# Patient Record
Sex: FEMALE | Race: WHITE | NOT HISPANIC OR LATINO | Employment: OTHER | URBAN - METROPOLITAN AREA
[De-identification: names, ages, dates, MRNs, and addresses within clinical notes are randomized per-mention and may not be internally consistent; named-entity substitution may affect disease eponyms.]

---

## 2017-01-23 ENCOUNTER — HOSPITAL ENCOUNTER (OUTPATIENT)
Dept: PREOP | Facility: HOSPITAL | Age: 52
Setting detail: HOSPITAL OUTPATIENT SURGERY
Discharge: HOME | End: 2017-01-23
Attending: SURGERY | Admitting: SURGERY

## 2017-01-23 LAB
ANION GAP SERPL CALC-SCNC: 11.9 MMOL/L (ref 10–20)
BUN SERPL-MCNC: 36 MG/DL (ref 8–20)
BUN/CREAT SERPL: 5.9 (ref 5.4–26.2)
CALCIUM SERPL-MCNC: 8.7 MG/DL (ref 8.9–10.3)
CHLORIDE SERPL-SCNC: 102 MMOL/L (ref 101–111)
CONV CO2: 26 MMOL/L (ref 22–32)
CREAT UR-MCNC: 6.1 MG/DL (ref 0.4–1)
GLUCOSE BLD-MCNC: 106 MG/DL (ref 70–105)
GLUCOSE SERPL-MCNC: 117 MG/DL (ref 65–99)
POTASSIUM SERPL-SCNC: 3.9 MMOL/L (ref 3.6–5.1)
SODIUM SERPL-SCNC: 136 MMOL/L (ref 136–144)

## 2017-03-20 ENCOUNTER — HOSPITAL ENCOUNTER (OUTPATIENT)
Dept: INTERVENTIONAL RADIOLOGY/VASCULAR | Facility: HOSPITAL | Age: 52
Discharge: HOME | End: 2017-03-20
Attending: INTERNAL MEDICINE | Admitting: INTERNAL MEDICINE

## 2017-06-14 ENCOUNTER — HOSPITAL ENCOUNTER (OUTPATIENT)
Dept: INTERVENTIONAL RADIOLOGY/VASCULAR | Facility: HOSPITAL | Age: 52
Discharge: HOME | End: 2017-06-14
Attending: INTERNAL MEDICINE | Admitting: INTERNAL MEDICINE

## 2017-06-14 LAB
APTT BLD: 22.1 SEC (ref 24–31)
BACTERIA SPEC AEROBE CULT: NORMAL
BASOPHILS # BLD AUTO: 0 10*3/UL (ref 0–0.2)
BASOPHILS NFR BLD AUTO: 1 % (ref 0–2)
DIFFERENTIAL METHOD BLD: (no result)
EOSINOPHIL # BLD AUTO: 0.1 10*3/UL (ref 0–0.3)
EOSINOPHIL # BLD AUTO: 2 % (ref 0–3)
ERYTHROCYTE [DISTWIDTH] IN BLOOD BY AUTOMATED COUNT: 16.3 % (ref 11.5–14.5)
HCT VFR BLD AUTO: 25.3 % (ref 35–49)
HGB BLD-MCNC: 8.6 G/DL (ref 12–15)
INR PPP: 0.9
LYMPHOCYTES # BLD AUTO: 1.9 10*3/UL (ref 0.8–4.8)
LYMPHOCYTES NFR BLD AUTO: 26 % (ref 18–42)
Lab: NORMAL
MCH RBC QN AUTO: 32.2 PG (ref 26–32)
MCHC RBC AUTO-ENTMCNC: 33.9 G/DL (ref 32–36)
MCV RBC AUTO: 95.1 FL (ref 80–94)
MICRO REPORT STATUS: NORMAL
MONOCYTES # BLD AUTO: 0.5 10*3/UL (ref 0.1–1.3)
MONOCYTES NFR BLD AUTO: 7 % (ref 2–11)
NEUTROPHILS # BLD AUTO: 4.6 10*3/UL (ref 2.3–8.6)
NEUTROPHILS NFR BLD AUTO: 64 % (ref 50–75)
NRBC BLD AUTO-RTO: 0 /100{WBCS}
NRBC/RBC NFR BLD MANUAL: 0 10*3/UL
PLATELET # BLD AUTO: 190 10*3/UL (ref 150–450)
PMV BLD AUTO: 8.2 FL (ref 7.4–10.4)
PROTHROMBIN TIME: 10.3 SEC (ref 9.6–11.7)
RBC # BLD AUTO: 2.65 10*6/UL (ref 4–5.4)
SPECIMEN SOURCE: NORMAL
WBC # BLD AUTO: 7.2 10*3/UL (ref 4.5–11.5)

## 2017-06-26 ENCOUNTER — HOSPITAL ENCOUNTER (OUTPATIENT)
Dept: INTERVENTIONAL RADIOLOGY/VASCULAR | Facility: HOSPITAL | Age: 52
Discharge: HOME | End: 2017-06-26
Attending: INTERNAL MEDICINE | Admitting: INTERNAL MEDICINE

## 2017-07-12 ENCOUNTER — HOSPITAL ENCOUNTER (OUTPATIENT)
Dept: CT IMAGING | Facility: HOSPITAL | Age: 52
Discharge: HOME | End: 2017-07-12
Attending: INTERNAL MEDICINE | Admitting: INTERNAL MEDICINE

## 2017-07-19 ENCOUNTER — HOSPITAL ENCOUNTER (OUTPATIENT)
Dept: INTERVENTIONAL RADIOLOGY/VASCULAR | Facility: HOSPITAL | Age: 52
Discharge: HOME | End: 2017-07-19
Attending: INTERNAL MEDICINE | Admitting: INTERNAL MEDICINE

## 2017-09-20 ENCOUNTER — HOSPITAL ENCOUNTER (OUTPATIENT)
Dept: SLEEP MEDICINE | Facility: HOSPITAL | Age: 52
Discharge: HOME | End: 2017-09-20
Attending: INTERNAL MEDICINE | Admitting: INTERNAL MEDICINE

## 2017-10-04 ENCOUNTER — HOSPITAL ENCOUNTER (OUTPATIENT)
Dept: SLEEP MEDICINE | Facility: HOSPITAL | Age: 52
Discharge: HOME | End: 2017-10-04
Attending: INTERNAL MEDICINE | Admitting: INTERNAL MEDICINE

## 2018-09-25 ENCOUNTER — INPATIENT HOSPITAL (AMBULATORY)
Dept: URBAN - METROPOLITAN AREA HOSPITAL 76 | Facility: HOSPITAL | Age: 53
End: 2018-09-25

## 2018-09-25 DIAGNOSIS — K64.3 FOURTH DEGREE HEMORRHOIDS: ICD-10-CM

## 2018-09-25 DIAGNOSIS — I25.10 ATHEROSCLEROTIC HEART DISEASE OF NATIVE CORONARY ARTERY WITH: ICD-10-CM

## 2018-09-25 DIAGNOSIS — K63.5 POLYP OF COLON: ICD-10-CM

## 2018-09-25 DIAGNOSIS — D50.0 IRON DEFICIENCY ANEMIA SECONDARY TO BLOOD LOSS (CHRONIC): ICD-10-CM

## 2018-09-25 DIAGNOSIS — D12.3 BENIGN NEOPLASM OF TRANSVERSE COLON: ICD-10-CM

## 2018-09-25 DIAGNOSIS — K92.1 MELENA: ICD-10-CM

## 2018-09-25 DIAGNOSIS — K62.1 RECTAL POLYP: ICD-10-CM

## 2018-09-25 DIAGNOSIS — N18.6 END STAGE RENAL DISEASE: ICD-10-CM

## 2018-09-25 PROCEDURE — 99222 1ST HOSP IP/OBS MODERATE 55: CPT | Performed by: INTERNAL MEDICINE

## 2018-09-26 PROCEDURE — 44360 SMALL BOWEL ENDOSCOPY: CPT | Performed by: INTERNAL MEDICINE

## 2018-09-26 PROCEDURE — 45385 COLONOSCOPY W/LESION REMOVAL: CPT | Performed by: INTERNAL MEDICINE

## 2018-10-19 ENCOUNTER — INPATIENT HOSPITAL (AMBULATORY)
Dept: URBAN - METROPOLITAN AREA HOSPITAL 84 | Facility: HOSPITAL | Age: 53
End: 2018-10-19

## 2018-10-19 DIAGNOSIS — D53.9 NUTRITIONAL ANEMIA, UNSPECIFIED: ICD-10-CM

## 2018-10-19 DIAGNOSIS — I25.10 ATHEROSCLEROTIC HEART DISEASE OF NATIVE CORONARY ARTERY WITH: ICD-10-CM

## 2018-10-19 DIAGNOSIS — R07.9 CHEST PAIN, UNSPECIFIED: ICD-10-CM

## 2018-10-19 DIAGNOSIS — E78.5 HYPERLIPIDEMIA, UNSPECIFIED: ICD-10-CM

## 2018-10-19 DIAGNOSIS — R06.00 DYSPNEA, UNSPECIFIED: ICD-10-CM

## 2018-10-19 DIAGNOSIS — I50.9 HEART FAILURE, UNSPECIFIED: ICD-10-CM

## 2018-10-19 DIAGNOSIS — R13.10 DYSPHAGIA, UNSPECIFIED: ICD-10-CM

## 2018-10-19 DIAGNOSIS — R11.2 NAUSEA WITH VOMITING, UNSPECIFIED: ICD-10-CM

## 2018-10-19 PROCEDURE — 99222 1ST HOSP IP/OBS MODERATE 55: CPT | Performed by: NURSE PRACTITIONER

## 2018-10-30 ENCOUNTER — ON CAMPUS - OUTPATIENT (AMBULATORY)
Dept: URBAN - METROPOLITAN AREA HOSPITAL 85 | Facility: HOSPITAL | Age: 53
End: 2018-10-30

## 2018-10-30 DIAGNOSIS — R11.2 NAUSEA WITH VOMITING, UNSPECIFIED: ICD-10-CM

## 2018-10-30 DIAGNOSIS — K92.1 MELENA: ICD-10-CM

## 2018-10-30 DIAGNOSIS — K92.2 GASTROINTESTINAL HEMORRHAGE, UNSPECIFIED: ICD-10-CM

## 2018-10-30 DIAGNOSIS — D64.89 OTHER SPECIFIED ANEMIAS: ICD-10-CM

## 2018-10-30 PROCEDURE — 99214 OFFICE O/P EST MOD 30 MIN: CPT | Performed by: NURSE PRACTITIONER

## 2018-11-08 ENCOUNTER — OFFICE (AMBULATORY)
Dept: URBAN - METROPOLITAN AREA CLINIC 64 | Facility: CLINIC | Age: 53
End: 2018-11-08

## 2018-11-08 DIAGNOSIS — D50.0 IRON DEFICIENCY ANEMIA SECONDARY TO BLOOD LOSS (CHRONIC): ICD-10-CM

## 2018-11-08 DIAGNOSIS — Z98.890 OTHER SPECIFIED POSTPROCEDURAL STATES: ICD-10-CM

## 2018-11-08 PROCEDURE — 91110 GI TRC IMG INTRAL ESOPH-ILE: CPT | Mod: 53 | Performed by: INTERNAL MEDICINE

## 2018-12-04 ENCOUNTER — ON CAMPUS - OUTPATIENT (AMBULATORY)
Dept: URBAN - METROPOLITAN AREA HOSPITAL 85 | Facility: HOSPITAL | Age: 53
End: 2018-12-04

## 2018-12-04 ENCOUNTER — HOSPITAL ENCOUNTER (OUTPATIENT)
Dept: GASTROENTEROLOGY | Facility: HOSPITAL | Age: 53
Setting detail: HOSPITAL OUTPATIENT SURGERY
Discharge: HOME | End: 2018-12-04
Attending: INTERNAL MEDICINE | Admitting: INTERNAL MEDICINE

## 2018-12-04 ENCOUNTER — OFFICE (AMBULATORY)
Dept: URBAN - METROPOLITAN AREA CLINIC 64 | Facility: CLINIC | Age: 53
End: 2018-12-04

## 2018-12-04 DIAGNOSIS — D50.0 IRON DEFICIENCY ANEMIA SECONDARY TO BLOOD LOSS (CHRONIC): ICD-10-CM

## 2018-12-04 DIAGNOSIS — K92.2 GASTROINTESTINAL HEMORRHAGE, UNSPECIFIED: ICD-10-CM

## 2018-12-04 DIAGNOSIS — Z98.890 OTHER SPECIFIED POSTPROCEDURAL STATES: ICD-10-CM

## 2018-12-04 LAB — GLUCOSE BLD-MCNC: 121 MG/DL (ref 70–105)

## 2018-12-04 PROCEDURE — 43235 EGD DIAGNOSTIC BRUSH WASH: CPT | Performed by: INTERNAL MEDICINE

## 2018-12-04 PROCEDURE — 91110 GI TRC IMG INTRAL ESOPH-ILE: CPT | Performed by: INTERNAL MEDICINE

## 2019-03-25 ENCOUNTER — INPATIENT HOSPITAL (AMBULATORY)
Dept: URBAN - METROPOLITAN AREA HOSPITAL 84 | Facility: HOSPITAL | Age: 54
End: 2019-03-25

## 2019-03-25 DIAGNOSIS — D64.9 ANEMIA, UNSPECIFIED: ICD-10-CM

## 2019-03-25 DIAGNOSIS — I25.10 ATHEROSCLEROTIC HEART DISEASE OF NATIVE CORONARY ARTERY WITH: ICD-10-CM

## 2019-03-25 DIAGNOSIS — K59.00 CONSTIPATION, UNSPECIFIED: ICD-10-CM

## 2019-03-25 DIAGNOSIS — I50.9 HEART FAILURE, UNSPECIFIED: ICD-10-CM

## 2019-03-25 DIAGNOSIS — K92.1 MELENA: ICD-10-CM

## 2019-03-25 PROCEDURE — 99222 1ST HOSP IP/OBS MODERATE 55: CPT | Performed by: NURSE PRACTITIONER

## 2019-03-26 ENCOUNTER — INPATIENT HOSPITAL (AMBULATORY)
Dept: URBAN - METROPOLITAN AREA HOSPITAL 84 | Facility: HOSPITAL | Age: 54
End: 2019-03-26

## 2019-03-26 DIAGNOSIS — K64.8 OTHER HEMORRHOIDS: ICD-10-CM

## 2019-03-26 DIAGNOSIS — K55.8 OTHER VASCULAR DISORDERS OF INTESTINE: ICD-10-CM

## 2019-03-26 DIAGNOSIS — K92.1 MELENA: ICD-10-CM

## 2019-03-26 PROCEDURE — 44360 SMALL BOWEL ENDOSCOPY: CPT | Performed by: INTERNAL MEDICINE

## 2019-03-26 PROCEDURE — 45378 DIAGNOSTIC COLONOSCOPY: CPT | Performed by: INTERNAL MEDICINE

## 2019-04-11 ENCOUNTER — INPATIENT HOSPITAL (AMBULATORY)
Dept: URBAN - METROPOLITAN AREA HOSPITAL 84 | Facility: HOSPITAL | Age: 54
End: 2019-04-11

## 2019-04-11 DIAGNOSIS — K92.1 MELENA: ICD-10-CM

## 2019-04-11 DIAGNOSIS — R11.2 NAUSEA WITH VOMITING, UNSPECIFIED: ICD-10-CM

## 2019-04-11 DIAGNOSIS — D50.0 IRON DEFICIENCY ANEMIA SECONDARY TO BLOOD LOSS (CHRONIC): ICD-10-CM

## 2019-04-11 DIAGNOSIS — K59.00 CONSTIPATION, UNSPECIFIED: ICD-10-CM

## 2019-04-11 DIAGNOSIS — R10.11 RIGHT UPPER QUADRANT PAIN: ICD-10-CM

## 2019-04-11 PROCEDURE — 99222 1ST HOSP IP/OBS MODERATE 55: CPT | Performed by: NURSE PRACTITIONER

## 2019-07-01 ENCOUNTER — HOSPITAL ENCOUNTER (INPATIENT)
Facility: HOSPITAL | Age: 54
LOS: 3 days | Discharge: HOME | End: 2019-07-05
Attending: INTERNAL MEDICINE | Admitting: INTERNAL MEDICINE

## 2019-07-01 PROBLEM — E66.9 OBESITY (BMI 30-39.9): Chronic | Status: ACTIVE | Noted: 2019-07-01

## 2019-07-01 PROBLEM — E11.40 TYPE 2 DIABETES MELLITUS WITH DIABETIC NEUROPATHY (HCC): Chronic | Status: ACTIVE | Noted: 2019-07-01

## 2019-07-01 PROBLEM — K21.9 GASTROESOPHAGEAL REFLUX DISEASE: Chronic | Status: ACTIVE | Noted: 2019-07-01

## 2019-07-01 PROBLEM — R77.8 ELEVATED TROPONIN: Status: ACTIVE | Noted: 2019-07-01

## 2019-07-01 PROBLEM — K21.9 GASTROESOPHAGEAL REFLUX DISEASE: Status: ACTIVE | Noted: 2019-07-01

## 2019-07-01 PROBLEM — E03.9 HYPOTHYROIDISM: Status: ACTIVE | Noted: 2019-07-01

## 2019-07-01 PROBLEM — E11.9 TYPE 2 DIABETES MELLITUS WITHOUT COMPLICATIONS (HCC): Status: ACTIVE | Noted: 2019-07-01

## 2019-07-01 PROBLEM — I16.0 HYPERTENSIVE URGENCY: Status: ACTIVE | Noted: 2019-07-01

## 2019-07-01 PROBLEM — N18.9 ACUTE ON CHRONIC RENAL FAILURE (HCC): Status: ACTIVE | Noted: 2018-11-19

## 2019-07-01 PROBLEM — E11.9 TYPE 2 DIABETES MELLITUS WITHOUT COMPLICATIONS (HCC): Chronic | Status: ACTIVE | Noted: 2019-07-01

## 2019-07-01 PROBLEM — Z99.2 ESRD ON DIALYSIS (HCC): Chronic | Status: ACTIVE | Noted: 2018-09-05

## 2019-07-01 PROBLEM — Z99.2 ESRD ON DIALYSIS (HCC): Status: ACTIVE | Noted: 2018-09-05

## 2019-07-01 PROBLEM — Z91.199 MEDICALLY NONCOMPLIANT: Chronic | Status: ACTIVE | Noted: 2019-07-01

## 2019-07-01 PROBLEM — N17.9 ACUTE ON CHRONIC RENAL FAILURE (HCC): Status: ACTIVE | Noted: 2018-11-19

## 2019-07-01 PROBLEM — N18.6 ESRD ON DIALYSIS (HCC): Status: ACTIVE | Noted: 2018-09-05

## 2019-07-01 PROBLEM — R10.9 ABDOMINAL PAIN: Status: ACTIVE | Noted: 2019-07-01

## 2019-07-01 PROBLEM — F17.200 TOBACCO DEPENDENCE SYNDROME: Status: ACTIVE | Noted: 2017-05-24

## 2019-07-01 PROBLEM — E78.5 HYPERLIPIDEMIA: Chronic | Status: ACTIVE | Noted: 2019-07-01

## 2019-07-01 PROBLEM — N25.81 HYPERPARATHYROIDISM DUE TO RENAL INSUFFICIENCY (HCC): Status: ACTIVE | Noted: 2017-05-24

## 2019-07-01 PROBLEM — N18.9 HISTORY OF ANEMIA DUE TO CKD: Chronic | Status: ACTIVE | Noted: 2019-07-01

## 2019-07-01 PROBLEM — N19 ACUTE UREMIA: Status: ACTIVE | Noted: 2019-07-01

## 2019-07-01 PROBLEM — Z86.2 HISTORY OF ANEMIA DUE TO CKD: Chronic | Status: ACTIVE | Noted: 2019-07-01

## 2019-07-01 PROBLEM — N18.6 ESRD ON DIALYSIS (HCC): Chronic | Status: ACTIVE | Noted: 2018-09-05

## 2019-07-01 PROBLEM — N25.81 HYPERPARATHYROIDISM DUE TO RENAL INSUFFICIENCY (HCC): Chronic | Status: ACTIVE | Noted: 2017-05-24

## 2019-07-01 PROBLEM — F17.200 TOBACCO DEPENDENCE SYNDROME: Chronic | Status: ACTIVE | Noted: 2017-05-24

## 2019-07-01 PROBLEM — E78.5 HYPERLIPIDEMIA: Status: ACTIVE | Noted: 2019-07-01

## 2019-07-01 PROBLEM — E03.9 HYPOTHYROIDISM: Chronic | Status: ACTIVE | Noted: 2019-07-01

## 2019-07-01 LAB
ANION GAP SERPL CALCULATED.3IONS-SCNC: 16.8 MMOL/L (ref 10–20)
BASOPHILS # BLD AUTO: 0 10*3/MM3 (ref 0–0.2)
BASOPHILS NFR BLD AUTO: 0.9 % (ref 0–1.5)
BUN BLD-MCNC: 63 MG/DL (ref 8–20)
BUN/CREAT SERPL: 6.5 (ref 5.4–26.2)
CALCIUM SPEC-SCNC: 8.2 MG/DL (ref 8.9–10.3)
CHLORIDE SERPL-SCNC: 110 MMOL/L (ref 101–111)
CO2 SERPL-SCNC: 15 MMOL/L (ref 22–32)
CREAT BLD-MCNC: 9.7 MG/DL (ref 0.4–1)
DEPRECATED RDW RBC AUTO: 66.9 FL (ref 37–54)
EOSINOPHIL # BLD AUTO: 0.1 10*3/MM3 (ref 0–0.4)
EOSINOPHIL NFR BLD AUTO: 1.4 % (ref 0.3–6.2)
ERYTHROCYTE [DISTWIDTH] IN BLOOD BY AUTOMATED COUNT: 19.5 % (ref 12.3–15.4)
GFR SERPL CREATININE-BSD FRML MDRD: 4 ML/MIN/1.73
GFR SERPL CREATININE-BSD FRML MDRD: ABNORMAL ML/MIN/1.73
GLUCOSE BLD-MCNC: 158 MG/DL (ref 65–99)
GLUCOSE BLDC GLUCOMTR-MCNC: 112 MG/DL (ref 70–105)
GLUCOSE BLDC GLUCOMTR-MCNC: 113 MG/DL (ref 70–105)
GLUCOSE BLDC GLUCOMTR-MCNC: 135 MG/DL (ref 70–105)
HBA1C MFR BLD: 4.7 % (ref 3.5–5.6)
HCT VFR BLD AUTO: 23.9 % (ref 34–46.6)
HGB BLD-MCNC: 7.5 G/DL (ref 12–15.9)
LYMPHOCYTES # BLD AUTO: 0.9 10*3/MM3 (ref 0.7–3.1)
LYMPHOCYTES NFR BLD AUTO: 17.6 % (ref 19.6–45.3)
MCH RBC QN AUTO: 30.4 PG (ref 26.6–33)
MCHC RBC AUTO-ENTMCNC: 31.5 G/DL (ref 31.5–35.7)
MCV RBC AUTO: 96.7 FL (ref 79–97)
MONOCYTES # BLD AUTO: 0.3 10*3/MM3 (ref 0.1–0.9)
MONOCYTES NFR BLD AUTO: 5.7 % (ref 5–12)
NEUTROPHILS # BLD AUTO: 3.6 10*3/MM3 (ref 1.7–7)
NEUTROPHILS NFR BLD AUTO: 74.4 % (ref 42.7–76)
NRBC BLD AUTO-RTO: 0.2 /100 WBC (ref 0–0.2)
PLATELET # BLD AUTO: 127 10*3/MM3 (ref 140–450)
PMV BLD AUTO: 7.9 FL (ref 6–12)
POTASSIUM BLD-SCNC: 3.8 MMOL/L (ref 3.6–5.1)
RBC # BLD AUTO: 2.48 10*6/MM3 (ref 3.77–5.28)
SODIUM BLD-SCNC: 138 MMOL/L (ref 136–144)
TROPONIN I SERPL-MCNC: 0.06 NG/ML (ref 0–0.03)
TROPONIN I SERPL-MCNC: 0.07 NG/ML (ref 0–0.03)
TROPONIN I SERPL-MCNC: 0.1 NG/ML (ref 0–0.03)
WBC NRBC COR # BLD: 4.9 10*3/MM3 (ref 3.4–10.8)

## 2019-07-01 PROCEDURE — 99223 1ST HOSP IP/OBS HIGH 75: CPT | Performed by: NURSE PRACTITIONER

## 2019-07-01 PROCEDURE — 25010000002 ONDANSETRON PER 1 MG: Performed by: NURSE PRACTITIONER

## 2019-07-01 PROCEDURE — 85025 COMPLETE CBC W/AUTO DIFF WBC: CPT | Performed by: INTERNAL MEDICINE

## 2019-07-01 PROCEDURE — G0378 HOSPITAL OBSERVATION PER HR: HCPCS

## 2019-07-01 PROCEDURE — 82962 GLUCOSE BLOOD TEST: CPT

## 2019-07-01 PROCEDURE — 83036 HEMOGLOBIN GLYCOSYLATED A1C: CPT | Performed by: NURSE PRACTITIONER

## 2019-07-01 PROCEDURE — 87340 HEPATITIS B SURFACE AG IA: CPT | Performed by: INTERNAL MEDICINE

## 2019-07-01 PROCEDURE — 94799 UNLISTED PULMONARY SVC/PX: CPT

## 2019-07-01 PROCEDURE — 80048 BASIC METABOLIC PNL TOTAL CA: CPT | Performed by: INTERNAL MEDICINE

## 2019-07-01 PROCEDURE — 84484 ASSAY OF TROPONIN QUANT: CPT | Performed by: NURSE PRACTITIONER

## 2019-07-01 PROCEDURE — 25010000002 HYDRALAZINE PER 20 MG: Performed by: NURSE PRACTITIONER

## 2019-07-01 PROCEDURE — 86704 HEP B CORE ANTIBODY TOTAL: CPT | Performed by: INTERNAL MEDICINE

## 2019-07-01 PROCEDURE — 93005 ELECTROCARDIOGRAM TRACING: CPT | Performed by: NURSE PRACTITIONER

## 2019-07-01 PROCEDURE — 86706 HEP B SURFACE ANTIBODY: CPT | Performed by: INTERNAL MEDICINE

## 2019-07-01 RX ORDER — TRAMADOL HYDROCHLORIDE 50 MG/1
50 TABLET ORAL EVERY 6 HOURS PRN
Status: DISCONTINUED | OUTPATIENT
Start: 2019-07-01 | End: 2019-07-05 | Stop reason: HOSPADM

## 2019-07-01 RX ORDER — ROSUVASTATIN CALCIUM 20 MG/1
40 TABLET, COATED ORAL DAILY
COMMUNITY

## 2019-07-01 RX ORDER — CHOLECALCIFEROL (VITAMIN D3) 125 MCG
5 CAPSULE ORAL NIGHTLY PRN
Status: DISCONTINUED | OUTPATIENT
Start: 2019-07-01 | End: 2019-07-05 | Stop reason: HOSPADM

## 2019-07-01 RX ORDER — NICOTINE POLACRILEX 4 MG
15 LOZENGE BUCCAL
Status: DISCONTINUED | OUTPATIENT
Start: 2019-07-01 | End: 2019-07-05 | Stop reason: HOSPADM

## 2019-07-01 RX ORDER — SODIUM CHLORIDE 0.9 % (FLUSH) 0.9 %
3-10 SYRINGE (ML) INJECTION AS NEEDED
Status: DISCONTINUED | OUTPATIENT
Start: 2019-07-01 | End: 2019-07-05 | Stop reason: HOSPADM

## 2019-07-01 RX ORDER — ISOSORBIDE MONONITRATE 30 MG/1
30 TABLET, EXTENDED RELEASE ORAL EVERY MORNING
Status: DISCONTINUED | OUTPATIENT
Start: 2019-07-02 | End: 2019-07-05 | Stop reason: HOSPADM

## 2019-07-01 RX ORDER — ACETAMINOPHEN 650 MG/1
650 SUPPOSITORY RECTAL EVERY 4 HOURS PRN
Status: DISCONTINUED | OUTPATIENT
Start: 2019-07-01 | End: 2019-07-05 | Stop reason: HOSPADM

## 2019-07-01 RX ORDER — ASPIRIN 81 MG/1
81 TABLET ORAL DAILY
COMMUNITY

## 2019-07-01 RX ORDER — HYDRALAZINE HYDROCHLORIDE 20 MG/ML
20 INJECTION INTRAMUSCULAR; INTRAVENOUS ONCE
Status: COMPLETED | OUTPATIENT
Start: 2019-07-01 | End: 2019-07-01

## 2019-07-01 RX ORDER — SODIUM CHLORIDE 0.9 % (FLUSH) 0.9 %
3 SYRINGE (ML) INJECTION EVERY 12 HOURS SCHEDULED
Status: DISCONTINUED | OUTPATIENT
Start: 2019-07-01 | End: 2019-07-05 | Stop reason: HOSPADM

## 2019-07-01 RX ORDER — DOCUSATE SODIUM 100 MG/1
100 CAPSULE, LIQUID FILLED ORAL 2 TIMES DAILY PRN
Status: DISCONTINUED | OUTPATIENT
Start: 2019-07-01 | End: 2019-07-05 | Stop reason: HOSPADM

## 2019-07-01 RX ORDER — OMEPRAZOLE 40 MG/1
40 CAPSULE, DELAYED RELEASE ORAL DAILY
COMMUNITY

## 2019-07-01 RX ORDER — GABAPENTIN 300 MG/1
300 CAPSULE ORAL NIGHTLY
Status: DISCONTINUED | OUTPATIENT
Start: 2019-07-01 | End: 2019-07-05 | Stop reason: HOSPADM

## 2019-07-01 RX ORDER — DOCUSATE SODIUM 100 MG/1
100 CAPSULE, LIQUID FILLED ORAL 2 TIMES DAILY PRN
COMMUNITY

## 2019-07-01 RX ORDER — ROSUVASTATIN CALCIUM 10 MG/1
40 TABLET, COATED ORAL NIGHTLY
Status: DISCONTINUED | OUTPATIENT
Start: 2019-07-01 | End: 2019-07-05 | Stop reason: HOSPADM

## 2019-07-01 RX ORDER — PROMETHAZINE HYDROCHLORIDE 25 MG/1
25 TABLET ORAL EVERY 6 HOURS PRN
COMMUNITY

## 2019-07-01 RX ORDER — PANTOPRAZOLE SODIUM 40 MG/1
40 TABLET, DELAYED RELEASE ORAL EVERY MORNING
Status: DISCONTINUED | OUTPATIENT
Start: 2019-07-02 | End: 2019-07-05 | Stop reason: HOSPADM

## 2019-07-01 RX ORDER — ONDANSETRON 2 MG/ML
4 INJECTION INTRAMUSCULAR; INTRAVENOUS EVERY 6 HOURS PRN
Status: DISCONTINUED | OUTPATIENT
Start: 2019-07-01 | End: 2019-07-05 | Stop reason: HOSPADM

## 2019-07-01 RX ORDER — HYDRALAZINE HYDROCHLORIDE 20 MG/ML
10 INJECTION INTRAMUSCULAR; INTRAVENOUS EVERY 4 HOURS PRN
Status: DISCONTINUED | OUTPATIENT
Start: 2019-07-01 | End: 2019-07-05 | Stop reason: HOSPADM

## 2019-07-01 RX ORDER — LORAZEPAM 0.5 MG/1
0.5 TABLET ORAL ONCE
Status: COMPLETED | OUTPATIENT
Start: 2019-07-01 | End: 2019-07-01

## 2019-07-01 RX ORDER — ACETAMINOPHEN 325 MG/1
650 TABLET ORAL EVERY 4 HOURS PRN
Status: DISCONTINUED | OUTPATIENT
Start: 2019-07-01 | End: 2019-07-05 | Stop reason: HOSPADM

## 2019-07-01 RX ORDER — FLUCONAZOLE 100 MG/1
100 TABLET ORAL DAILY
COMMUNITY

## 2019-07-01 RX ORDER — LEVOTHYROXINE SODIUM 0.07 MG/1
75 TABLET ORAL
Status: DISCONTINUED | OUTPATIENT
Start: 2019-07-01 | End: 2019-07-05 | Stop reason: HOSPADM

## 2019-07-01 RX ORDER — GABAPENTIN 300 MG/1
300 CAPSULE ORAL DAILY
COMMUNITY

## 2019-07-01 RX ORDER — CARVEDILOL 6.25 MG/1
6.25 TABLET ORAL 2 TIMES DAILY WITH MEALS
Status: DISCONTINUED | OUTPATIENT
Start: 2019-07-01 | End: 2019-07-05 | Stop reason: HOSPADM

## 2019-07-01 RX ORDER — BISACODYL 10 MG
10 SUPPOSITORY, RECTAL RECTAL DAILY PRN
Status: DISCONTINUED | OUTPATIENT
Start: 2019-07-01 | End: 2019-07-05 | Stop reason: HOSPADM

## 2019-07-01 RX ORDER — FUROSEMIDE 40 MG/1
40 TABLET ORAL
Status: DISCONTINUED | OUTPATIENT
Start: 2019-07-01 | End: 2019-07-05 | Stop reason: HOSPADM

## 2019-07-01 RX ORDER — LEVOTHYROXINE SODIUM 0.07 MG/1
75 TABLET ORAL DAILY
COMMUNITY

## 2019-07-01 RX ORDER — NITROGLYCERIN 0.4 MG/1
0.4 TABLET SUBLINGUAL
Status: DISCONTINUED | OUTPATIENT
Start: 2019-07-01 | End: 2019-07-05 | Stop reason: HOSPADM

## 2019-07-01 RX ORDER — ONDANSETRON 4 MG/1
4 TABLET, FILM COATED ORAL EVERY 6 HOURS PRN
Status: DISCONTINUED | OUTPATIENT
Start: 2019-07-01 | End: 2019-07-05 | Stop reason: HOSPADM

## 2019-07-01 RX ORDER — DEXTROSE MONOHYDRATE 25 G/50ML
25 INJECTION, SOLUTION INTRAVENOUS
Status: DISCONTINUED | OUTPATIENT
Start: 2019-07-01 | End: 2019-07-05 | Stop reason: HOSPADM

## 2019-07-01 RX ORDER — ISOSORBIDE MONONITRATE 30 MG/1
30 TABLET, EXTENDED RELEASE ORAL DAILY
COMMUNITY

## 2019-07-01 RX ORDER — CLONIDINE 0.2 MG/24H
1 PATCH, EXTENDED RELEASE TRANSDERMAL WEEKLY
Status: DISCONTINUED | OUTPATIENT
Start: 2019-07-08 | End: 2019-07-05 | Stop reason: HOSPADM

## 2019-07-01 RX ORDER — CARVEDILOL 6.25 MG/1
6.25 TABLET ORAL 2 TIMES DAILY WITH MEALS
COMMUNITY

## 2019-07-01 RX ORDER — ASPIRIN 81 MG/1
81 TABLET ORAL DAILY
Status: DISCONTINUED | OUTPATIENT
Start: 2019-07-01 | End: 2019-07-05 | Stop reason: HOSPADM

## 2019-07-01 RX ORDER — FUROSEMIDE 40 MG/1
40 TABLET ORAL 2 TIMES DAILY
COMMUNITY

## 2019-07-01 RX ADMIN — TRAMADOL HYDROCHLORIDE 50 MG: 50 TABLET, COATED ORAL at 15:44

## 2019-07-01 RX ADMIN — ACETAMINOPHEN 650 MG: 325 TABLET, FILM COATED ORAL at 18:40

## 2019-07-01 RX ADMIN — TRAMADOL HYDROCHLORIDE 50 MG: 50 TABLET, COATED ORAL at 22:39

## 2019-07-01 RX ADMIN — ASPIRIN 81 MG: 81 TABLET, COATED ORAL at 15:44

## 2019-07-01 RX ADMIN — LEVOTHYROXINE SODIUM 75 MCG: 75 TABLET ORAL at 13:23

## 2019-07-01 RX ADMIN — HYDRALAZINE HYDROCHLORIDE 20 MG: 20 INJECTION INTRAMUSCULAR; INTRAVENOUS at 13:23

## 2019-07-01 RX ADMIN — Medication 10 ML: at 21:00

## 2019-07-01 RX ADMIN — Medication 3 ML: at 13:27

## 2019-07-01 RX ADMIN — LORAZEPAM 0.5 MG: 0.5 TABLET ORAL at 15:44

## 2019-07-01 RX ADMIN — ONDANSETRON 4 MG: 2 INJECTION INTRAMUSCULAR; INTRAVENOUS at 22:50

## 2019-07-01 RX ADMIN — ONDANSETRON HYDROCHLORIDE 4 MG: 4 TABLET, FILM COATED ORAL at 15:44

## 2019-07-01 NOTE — H&P
[facility] HOSPITALIST       PCP:  Flori Kauffman MD   Nephrology:  Dr. Benedict  GI:  Dr. Vicente      CHIEF COMPLAINT:     Abdominal pain    HISTORY OF PRESENT ILLNESS:    This is a 54-year-old obese  female with a history of ESRD dependent on hemodialysis, but chronically noncompliant, who was transferred from [facility] with complaint of abdominal pain. The patient states she cannot drive and has to depend on someone else to take her to her dialysis appointments. She states her last dialysis treatment was done approximately 1 month ago. She states she previously made an agreement with Dr. Benedict that she could do dialysis twice a week instead of three times per week. She states she was getting dialysis in [place], but now lives in [place] and is having difficulty finding a ride to dialysis. She states she has had severe, sharp abdominal pain for the past few days that is getting progressively worse. She reports nausea, but denies vomiting. She also reports some swelling of her abdomen. She denies any chest pain or shortness of breath. She denies any fever, chills, or diarrhea. She states she had a recent GI bleed and was referred to a specialist in [place]. She states she had to change the appointment around, but is scheduled to see the specialist next week. She denies any exacerbating or alleviating factors.     Review of records reveal the patient was admitted to [facility] on 6/7/19 for similar symptoms. She was discharged the same day after receiving dialysis.     ======================================================================================  Review of records from [facility]:    Significant labs:  hgb 7.8, plt 130, K 3.8, BUN 61, Cr .9.57, glucose 120, AST 37, protein 5.5, albumin 2.9, troponin 0.05    CT abd/pelvis:  Moderate ascites, unchanged. Diffuse body wall edema stable to slightly increased. Advanced atherosclerosis.      Medication given:  Clonidine 0.2 mg patch, Fentanyl 25 mcg IV, hydralazine 20 mg IV, and Zofran 4 mg IV  =======================================================================================    The patient was transferred here on 07/01/2019 for further evaluation and treatment.         Past Medical History:   Diagnosis Date   • COPD (chronic obstructive pulmonary disease) (CMS/[facility])    • Coronary artery disease    • Diabetes mellitus (CMS/[facility])    • Disease of thyroid gland    • Elevated cholesterol    • ESRD (end stage renal disease) on dialysis (CMS/[facility])    • GERD (gastroesophageal reflux disease)    • Hypertension    • Medically noncompliant      Past Surgical History:   Procedure Laterality Date   • ABDOMINAL SURGERY      Gall Bladder   • APPENDECTOMY     • CARDIAC CATHETERIZATION     • CARDIAC SURGERY     • COLONOSCOPY     • ENDOSCOPY     • HYSTERECTOMY     • VASCULAR SURGERY       History reviewed. No pertinent family history.  Social History     Tobacco Use   • Smoking status: Current Every Day Smoker     Packs/day: 0.50     Years: 30.00     Pack years: 15.00     Types: Cigarettes   • Smokeless tobacco: Never Used   Substance Use Topics   • Alcohol use: No     Frequency: Never   • Drug use: No     Medications Prior to Admission   Medication Sig Dispense Refill Last Dose   • aspirin 81 MG EC tablet Take 81 mg by mouth Daily. In the morning      • carvedilol (COREG) 6.25 MG tablet Take 6.25 mg by mouth 2 (Two) Times a Day With Meals.      • docusate sodium (COLACE) 100 MG capsule Take 100 mg by mouth 2 (Two) Times a Day As Needed for Constipation.      • fluconazole (DIFLUCAN) 100 MG tablet Take 100 mg by mouth Daily.      • furosemide (LASIX) 40 MG tablet Take 40 mg by mouth 2 (Two) Times a Day.      • gabapentin (NEURONTIN) 300 MG capsule Take 300 mg by mouth Daily. Take at bedtime      • isosorbide mononitrate (IMDUR) 30 MG 24 hr tablet Take 30 mg by mouth Daily. In the morning      • levothyroxine  "(SYNTHROID, LEVOTHROID) 75 MCG tablet Take 75 mcg by mouth Daily.      • linaclotide (LINZESS) 290 MCG capsule capsule Take 290 mcg by mouth Every Morning Before Breakfast.      • omeprazole (priLOSEC) 40 MG capsule Take 40 mg by mouth Daily.      • promethazine (PHENERGAN) 25 MG tablet Take 25 mg by mouth Every 6 (Six) Hours As Needed for Nausea or Vomiting.      • rosuvastatin (CRESTOR) 20 MG tablet Take 40 mg by mouth Daily.        Allergies:  Sulfamethoxazole-trimethoprim      There is no immunization history on file for this patient.        REVIEW OF SYSTEMS:    Review of Systems   Constitutional: Negative for chills, diaphoresis and fever.   Respiratory: Negative for shortness of breath.    Cardiovascular: Negative for chest pain and leg swelling.   Gastrointestinal: Positive for abdominal distention, abdominal pain and nausea. Negative for diarrhea and vomiting.       Vital Signs  Temp:  [97.7 °F (36.5 °C)-98 °F (36.7 °C)] 98 °F (36.7 °C)  Heart Rate:  [80-86] 80  Resp:  [19-20] 19  BP: (207-214)/(70-75) 214/70    Flowsheet Rows      First Filed Value   Admission Height  167.6 cm (66\") Documented at 07/01/2019 0851   Admission Weight  97.2 kg (214 lb 4.6 oz) Documented at 07/01/2019 0851           Physical Exam:  Physical Exam   Constitutional: She is oriented to person, place, and time and well-developed, well-nourished, and in no distress.   HENT:   Head: Normocephalic.   Eyes: Conjunctivae and EOM are normal. Pupils are equal, round, and reactive to light.   Neck: Normal range of motion. Neck supple.   Cardiovascular: Normal rate, regular rhythm, normal heart sounds and intact distal pulses.   Pulmonary/Chest: Effort normal. She has rales.   Abdominal: Soft. She exhibits distension and ascites. Bowel sounds are hypoactive. There is tenderness.   Musculoskeletal: Normal range of motion. She exhibits no edema.   Neurological: She is alert and oriented to person, place, and time. GCS score is 15.   Skin: Skin " is warm and dry. No rash noted. No erythema.   LUE AV fistula with good bruit and thrill   Psychiatric: Affect normal.         Results Review:   I reviewed the patient's new clinical results.    Lab Results (most recent)     Procedure Component Value Units Date/Time    Troponin [[id number]]  (Abnormal) Collected:  07/01/19 1331    Specimen:  Blood Updated:  07/01/19 1547     Troponin I 0.060 ng/mL     Narrative:       Troponin I Reference Range:    0.00-0.03  Negative.  Repeat testing in 4-6 hours if clinically indicated.    0.04-0.29  Suspicious for myocardial injury. Serial measurements and clinical  correlation may be necessary to confirm or exclude diagnosis of acute  coronary syndrome.  Repeat testing in 4-6 hours if indicated.     >0.29 Consistent with myocardial injury.  Recommend clinical and laboratory correlation.     Results my be falsely decreased if patient taking Biotin.     CBC & Differential [[id number]] Collected:  07/01/19 1331    Specimen:  Blood Updated:  07/01/19 1414    Narrative:       The following orders were created for panel order CBC & Differential.  Procedure                               Abnormality         Status                     ---------                               -----------         ------                     CBC Auto Differential[[id number]]        Abnormal            Final result                 Please view results for these tests on the individual orders.    CBC Auto Differential [[id number]]  (Abnormal) Collected:  07/01/19 1331    Specimen:  Blood Updated:  07/01/19 1414     WBC 4.90 10*3/mm3      RBC 2.48 10*6/mm3      Hemoglobin 7.5 g/dL      Hematocrit 23.9 %      MCV 96.7 fL      MCH 30.4 pg      MCHC 31.5 g/dL      RDW 19.5 %      RDW-SD 66.9 fl      MPV 7.9 fL      Platelets 127 10*3/mm3      Neutrophil % 74.4 %      Lymphocyte % 17.6 %      Monocyte % 5.7 %      Eosinophil % 1.4 %      Basophil % 0.9 %      Neutrophils, Absolute 3.60 10*3/mm3      Lymphocytes, Absolute  0.90 10*3/mm3      Monocytes, Absolute 0.30 10*3/mm3      Eosinophils, Absolute 0.10 10*3/mm3      Basophils, Absolute 0.00 10*3/mm3      nRBC 0.2 /100 WBC     Basic Metabolic Panel [[id number]]  (Abnormal) Collected:  07/01/19 1331    Specimen:  Blood Updated:  07/01/19 1411     Glucose 158 mg/dL      BUN 63 mg/dL      Creatinine 9.70 mg/dL      Sodium 138 mmol/L      Potassium 3.8 mmol/L      Chloride 110 mmol/L      CO2 15.0 mmol/L      Calcium 8.2 mg/dL      eGFR  African Amer -- mL/min/1.73      Comment: <15 Indicative of kidney failure.        eGFR Non African Amer 4 mL/min/1.73      Comment: <15 Indicative of kidney failure.        BUN/Creatinine Ratio 6.5     Anion Gap 16.8 mmol/L     Hemoglobin A1c [[id number]]  (Normal) Collected:  07/01/19 1052    Specimen:  Blood Updated:  07/01/19 1234     Hemoglobin A1C 4.7 %     Narrative:       Hemoglobin A1C Reference Range:    <5.7 %        Normal  5.7-6.4 %     Increased risk for diabetes  > 6.4 %        Diabetes       These guidelines have been recommended by the American Diabetic Association for Hgb A1c.      The following 2010 guidelines have been recommended by the American Diabetes Association for Hemoglobin A1c.    HBA1c 5.7-6.4% Increased risk for future diabetes (pre-diabetes)  HBA1c     >6.4% Diabetes    Troponin [[id number]]  (Abnormal) Collected:  07/01/19 1052    Specimen:  Blood Updated:  07/01/19 1151     Troponin I 0.070 ng/mL     Narrative:       Troponin I Reference Range:    0.00-0.03  Negative.  Repeat testing in 4-6 hours if clinically indicated.    0.04-0.29  Suspicious for myocardial injury. Serial measurements and clinical  correlation may be necessary to confirm or exclude diagnosis of acute  coronary syndrome.  Repeat testing in 4-6 hours if indicated.     >0.29 Consistent with myocardial injury.  Recommend clinical and laboratory correlation.     Results my be falsely decreased if patient taking Biotin.     POC Glucose Once [[id number]]   (Abnormal) Collected:  07/01/19 1131    Specimen:  Blood Updated:  07/01/19 1137     Glucose 135 mg/dL      Comment: Serial Number: [id number]:  [id number]             Imaging Results (most recent)     None            ECG/EMG Results (most recent)     Procedure Component Value Units Date/Time    ECG 12 Lead [[id number]] Collected:  07/01/19 1448     Updated:  07/01/19 1449    Narrative:       HEART RATE= 80  bpm  RR Interval= 748  ms  NC Interval= 211  ms  P Horizontal Axis= -12  deg  P Front Axis= 50  deg  QRSD Interval= 94  ms  QT Interval= 408  ms  QRS Axis= 0  deg  T Wave Axis= 138  deg  - ABNORMAL ECG -  Sinus rhythm  Atrial premature complex  Prolonged NC interval  Probable anterior infarct, age indeterminate  Electronically Signed By:   Date and Time of Study: 2019-07-01 14:48:38                Assessment/Plan       Acute uremia    Acute on chronic renal failure (CMS/[facility])    Hypertensive urgency    Abdominal pain    Elevated troponin    ESRD on dialysis (CMS/[facility])    Tobacco dependence syndrome    Hypothyroidism    Hyperlipidemia    Gastroesophageal reflux disease    Type 2 diabetes mellitus with diabetic neuropathy (CMS/[facility])    Medically noncompliant    Obesity (BMI 30-39.9)    History of anemia due to CKD      Acute on chronic renal failure with acute uremia secondary to noncompliance with dialysis / h/o ESRD dependent on hemodialysis with chronic noncompliance  - consult nephrology for dialysis management   - monitor I&Os and daily weight  - continue home Lasix      Hypertensive urgency with h/o HTN  - likely secondary to noncompliance with dialysis  - clonidine 0.2 mg patch applied at [facility]  - PRN IV hydralazine  - continue home Imdur, carvedilol, and Lasix  - closely monitor BP    Abdominal pain  - appears acute on chronic  - CT abd/pelvis done at [facility] showed no acute abnormalities; moderate ascites unchanged  - on Linzess at home  - Tylenol PRN pain  - needs outpatient follow up with GI    Elevated  troponin  - likely chronic secondary to ESRD  - check serial troponin     Anemia of CKD with recent GI bleed  - hgb stable, monitor  - patient is scheduled to see GI specialist in [place] next week for recent GI bleed    Hypothyroidism  - continue home levothyroxine     Hyperlipidemia  - continue home statin     Gastroesophageal reflux disease  - continue home PPI    Type 2 diabetes mellitus with diabetic neuropathy  - not on any home diabetes meds   - check A1c  - accu checks AC&HS with [facility]  - LCS diet    Medically noncompliant  - encourage complance  - consult     Obesity  - BMI 34.59  - encourage lifestyle modifications     Tobacco dependence syndrome  - encourage cessation   - refused nicotine patch    VTE Prophylaxis - bilateral SCDs    Electronically signed by FIDEL Contreras, 07/01/19, 4:39 PM.

## 2019-07-01 NOTE — CONSULTS
NEPHROLOGY CONSULTATION-----[facility]    [facility]  [phone]  Kashif Camacho MD    Patient Care Team:  Flori Kauffman MD as PCP - General  Flori Kauffman MD as PCP - Claims Attributed  Provider, No Known as PCP - Family Medicine    CC/REASON FOR CONSULTATION: ESRD    History of Present Illness  54 year old female with PMHx HTN, DM, ESRD presented with inc SOA. She has missed dialysis for 3 weeks. She has hx of non compliance with dialysis, and this time states has transportation issues. No vomiting or diarrhea. No fever. Reports swelling all over. She is followed by Dr Benedict for dialysis in [place].     Review of Systems   As noted    Past Medical History:   Diagnosis Date   • CHF (congestive heart failure) (CMS/H[facility])    • COPD (chronic obstructive pulmonary disease) (CMS/[facility])    • Coronary artery disease    • Diabetes mellitus (CMS/[facility])    • Disease of thyroid gland    • Elevated cholesterol    • GERD (gastroesophageal reflux disease)    • Hypertension        Past Surgical History:   Procedure Laterality Date   • ABDOMINAL SURGERY      Gall Bladder   • APPENDECTOMY     • CARDIAC CATHETERIZATION     • CARDIAC SURGERY     • COLONOSCOPY     • ENDOSCOPY     • HYSTERECTOMY     • VASCULAR SURGERY         History reviewed. No pertinent family history.    Social History     Tobacco Use   • Smoking status: Current Every Day Smoker     Packs/day: 0.25     Years: 30.00     Pack years: 7.50     Types: Cigarettes   Substance Use Topics   • Alcohol use: No     Frequency: Never   • Drug use: No       Home Meds:   Medications Prior to Admission   Medication Sig Dispense Refill Last Dose   • aspirin 81 MG EC tablet Take 81 mg by mouth Daily. In the morning      • carvedilol (COREG) 6.25 MG tablet Take 6.25 mg by mouth 2 (Two) Times a Day With Meals.      • docusate sodium (COLACE) 100 MG capsule Take 100 mg by mouth 2 (Two) Times a Day As Needed for Constipation.       • fluconazole (DIFLUCAN) 100 MG tablet Take 100 mg by mouth Daily.      • furosemide (LASIX) 40 MG tablet Take 40 mg by mouth 2 (Two) Times a Day.      • gabapentin (NEURONTIN) 300 MG capsule Take 300 mg by mouth Daily. Take at bedtime      • isosorbide mononitrate (IMDUR) 30 MG 24 hr tablet Take 30 mg by mouth Daily. In the morning      • levothyroxine (SYNTHROID, LEVOTHROID) 75 MCG tablet Take 75 mcg by mouth Daily.      • linaclotide (LINZESS) 290 MCG capsule capsule Take 290 mcg by mouth Every Morning Before Breakfast.      • omeprazole (priLOSEC) 40 MG capsule Take 40 mg by mouth Daily.      • promethazine (PHENERGAN) 25 MG tablet Take 25 mg by mouth Every 6 (Six) Hours As Needed for Nausea or Vomiting.      • rosuvastatin (CRESTOR) 20 MG tablet Take 40 mg by mouth Daily.          Scheduled Meds:    aspirin 81 mg Oral Daily   carvedilol 6.25 mg Oral BID With Meals   furosemide 40 mg Oral BID   gabapentin 300 mg Oral Nightly   hydrALAZINE 20 mg Intravenous Once   insulin lispro 0-9 Units Subcutaneous 4x Daily With Meals & Nightly   [START ON 7/2/2019] isosorbide mononitrate 30 mg Oral QAM   levothyroxine 75 mcg Oral Daily   [START ON 7/2/2019] pantoprazole 40 mg Oral QAM   rosuvastatin 40 mg Oral Daily   sodium chloride 3 mL Intravenous Q12H       Continuous Infusions:       PRN Meds:  •  acetaminophen  •  acetaminophen  •  bisacodyl  •  dextrose  •  dextrose  •  docusate sodium  •  glucagon (human recombinant)  •  hydrALAZINE  •  melatonin  •  nitroglycerin  •  ondansetron **OR** ondansetron  •  sodium chloride    Allergies:  Bactrim [sulfamethoxazole-trimethoprim]      OBJECTIVE    Vital Signs  Temp:  [97.7 °F (36.5 °C)-98 °F (36.7 °C)] 98 °F (36.7 °C)  Heart Rate:  [85-86] 85  Resp:  [19-20] 19  BP: (207-208)/(74-75) 208/74    No intake/output data recorded.  No intake/output data recorded.    Physical Exam:  General Appearance: alert, appears stated age and cooperative  Head: normocephalic, without obvious  abnormality and atraumatic  Eyes: conjunctivae and sclerae normal and no icterus  Neck: supple and no JVD  Lungs: Diminished BS  Heart: regular rhythm & normal rate and normal S1, S2  Chest Wall: no abnormalities observed  Abdomen: normal bowel sounds and soft non-tender  Extremities: moves extremities well, 1+  edema, no cyanosis and no redness  Skin: no bleeding, bruising or rash, turgor normal, color normal and no leasions noted  Neurologic: Alert, and oriented. No focal deficits    Results Review:    I reviewed the patient's new clinical results.    WBC No results found for: WBC   HGB No results found for: HGB   HCT No results found for: HCT   Platlets No results found for: LABPLAT   MCV No results found for: MCV       Sodium No results found for: NA   Potassium No results found for: K   Chloride No results found for: CL   CO2 No results found for: CO2   BUN No results found for: BUN   Creatinine No results found for: CREATININE   Calcium No results found for: CALCIUM   PO4 No results found for: CAPO4   Albumin No results found for: ALBUMIN   Magnesium No results found for: MG   Uric Acid No results found for: URICACID       Imaging Results (last 72 hours)     ** No results found for the last 72 hours. **            Results for orders placed during the hospital encounter of 12/01/18   XR Chest 1 View    Narrative DATE OF SERVICE:  12/1/2018 7:27 AM    PROCEDURE:  XR CHEST PORTABLE AT BEDSIDE    HISTORY:  CHEST PAIN, UNSPECIFIED    COMPARISON:  October 19, 2018    TECHNIQUE:  A radiologic portable AP view of the chest was obtained.    DISCUSSION:  Median sternotomy wires appear intact.  There are stable chronic changes at  the left lung base, likely scarring.  No new focal airspace consolidation is  identified.  The heart and mediastinal contours appear stable.  The pulmonary  vasculature appears normal.    IMPRESSION:  No definite acute cardiopulmonary process identified.        Yared Newsome MD    DS:  12/01/2018 09:01  Report ID: [id number]  cc:  JUSTIN FAM      FINAL AUTHENTICATED COPY                ASSESSMENT      * No active hospital problems. *    · ESRD  · HTN  · DM  · COPD  · Fluid overload  · Medical non compliance  PLAN    HD today  Will get labs  Ask  to see about transportation issues.  Will follow      I discussed the patients findings and my recommendations with patient    Will follow along closely. Thank you for allowing us to see this patient in renal consultation.    [facility]  [phone]  MD Kashif Nelson MD  07/01/19  12:25 PM

## 2019-07-01 NOTE — PLAN OF CARE
Problem: Patient Care Overview  Goal: Plan of Care Review  Outcome: Ongoing (interventions implemented as appropriate)   07/01/19 0486   Coping/Psychosocial   Plan of Care Reviewed With patient   OTHER   Outcome Summary Patient admitted to this unit this morning. Patient complains of pain of a 9/10. Patient also complains of nausea and vomiting. Patient treated with zofran PO and tramadol PO. Patients hgb resulted at 7.5, Physician was notified with no new orders. Patient scheduled for hemodialysis tonight. High blood pressure was effectively treated with hydralazine IV. Will continue to monitor.     Goal: Individualization and Mutuality  Outcome: Ongoing (interventions implemented as appropriate)    Goal: Discharge Needs Assessment  Outcome: Ongoing (interventions implemented as appropriate)    Goal: Interprofessional Rounds/Family Conf  Outcome: Ongoing (interventions implemented as appropriate)      Problem: Fall Risk (Adult)  Goal: Identify Related Risk Factors and Signs and Symptoms  Outcome: Ongoing (interventions implemented as appropriate)    Goal: Absence of Fall  Outcome: Ongoing (interventions implemented as appropriate)      Problem: Skin Injury Risk (Adult)  Goal: Identify Related Risk Factors and Signs and Symptoms  Outcome: Ongoing (interventions implemented as appropriate)    Goal: Skin Health and Integrity  Outcome: Ongoing (interventions implemented as appropriate)

## 2019-07-02 PROBLEM — N19 UREMIA: Status: ACTIVE | Noted: 2019-07-02

## 2019-07-02 LAB
ABO GROUP BLD: NORMAL
ALBUMIN SERPL-MCNC: 2.3 G/DL (ref 3.5–4.8)
ANION GAP SERPL CALCULATED.3IONS-SCNC: 11.3 MMOL/L (ref 10–20)
BASOPHILS # BLD AUTO: 0 10*3/MM3 (ref 0–0.2)
BASOPHILS NFR BLD AUTO: 1.2 % (ref 0–1.5)
BLD GP AB SCN SERPL QL: NEGATIVE
BUN BLD-MCNC: 25 MG/DL (ref 8–20)
BUN/CREAT SERPL: 5.1 (ref 5.4–26.2)
CA-I SERPL ISE-MCNC: 1.15 MMOL/L (ref 1.2–1.3)
CALCIUM SPEC-SCNC: 8.1 MG/DL (ref 8.9–10.3)
CHLORIDE SERPL-SCNC: 108 MMOL/L (ref 101–111)
CO2 SERPL-SCNC: 21 MMOL/L (ref 22–32)
CREAT BLD-MCNC: 4.9 MG/DL (ref 0.4–1)
DEPRECATED RDW RBC AUTO: 66.1 FL (ref 37–54)
EOSINOPHIL # BLD AUTO: 0.1 10*3/MM3 (ref 0–0.4)
EOSINOPHIL NFR BLD AUTO: 2.9 % (ref 0.3–6.2)
ERYTHROCYTE [DISTWIDTH] IN BLOOD BY AUTOMATED COUNT: 19.4 % (ref 12.3–15.4)
GFR SERPL CREATININE-BSD FRML MDRD: 9 ML/MIN/1.73
GFR SERPL CREATININE-BSD FRML MDRD: ABNORMAL ML/MIN/1.73
GLUCOSE BLD-MCNC: 84 MG/DL (ref 65–99)
GLUCOSE BLDC GLUCOMTR-MCNC: 103 MG/DL (ref 70–105)
GLUCOSE BLDC GLUCOMTR-MCNC: 79 MG/DL (ref 70–105)
GLUCOSE BLDC GLUCOMTR-MCNC: 87 MG/DL (ref 70–105)
GLUCOSE BLDC GLUCOMTR-MCNC: 98 MG/DL (ref 70–105)
HBV SURFACE AB SER RIA-ACNC: ABNORMAL
HBV SURFACE AB SER RIA-ACNC: ABNORMAL
HBV SURFACE AG SERPL QL IA: NORMAL
HCT VFR BLD AUTO: 19.9 % (ref 34–46.6)
HCT VFR BLD AUTO: 20.1 % (ref 34–46.6)
HGB BLD-MCNC: 6.5 G/DL (ref 12–15.9)
HGB BLD-MCNC: 6.5 G/DL (ref 12–15.9)
LYMPHOCYTES # BLD AUTO: 0.5 10*3/MM3 (ref 0.7–3.1)
LYMPHOCYTES NFR BLD AUTO: 21.2 % (ref 19.6–45.3)
MAGNESIUM SERPL-MCNC: 1.6 MG/DL (ref 1.8–2.5)
MCH RBC QN AUTO: 30.8 PG (ref 26.6–33)
MCHC RBC AUTO-ENTMCNC: 32.7 G/DL (ref 31.5–35.7)
MCV RBC AUTO: 94.2 FL (ref 79–97)
MONOCYTES # BLD AUTO: 0.2 10*3/MM3 (ref 0.1–0.9)
MONOCYTES NFR BLD AUTO: 9.2 % (ref 5–12)
NEUTROPHILS # BLD AUTO: 1.7 10*3/MM3 (ref 1.7–7)
NEUTROPHILS NFR BLD AUTO: 65.5 % (ref 42.7–76)
NRBC BLD AUTO-RTO: 0.3 /100 WBC (ref 0–0.2)
PHOSPHATE SERPL-MCNC: 3.5 MG/DL (ref 2.4–4.7)
PLATELET # BLD AUTO: 109 10*3/MM3 (ref 140–450)
PMV BLD AUTO: 8.1 FL (ref 6–12)
POTASSIUM BLD-SCNC: 3.3 MMOL/L (ref 3.6–5.1)
RBC # BLD AUTO: 2.12 10*6/MM3 (ref 3.77–5.28)
RH BLD: NEGATIVE
SODIUM BLD-SCNC: 137 MMOL/L (ref 136–144)
T&S EXPIRATION DATE: NORMAL
TROPONIN I SERPL-MCNC: 0.19 NG/ML (ref 0–0.03)
WBC NRBC COR # BLD: 2.6 10*3/MM3 (ref 3.4–10.8)

## 2019-07-02 PROCEDURE — 86900 BLOOD TYPING SEROLOGIC ABO: CPT

## 2019-07-02 PROCEDURE — 82962 GLUCOSE BLOOD TEST: CPT

## 2019-07-02 PROCEDURE — 86901 BLOOD TYPING SEROLOGIC RH(D): CPT | Performed by: NURSE PRACTITIONER

## 2019-07-02 PROCEDURE — 63710000001 DIPHENHYDRAMINE PER 50 MG: Performed by: INTERNAL MEDICINE

## 2019-07-02 PROCEDURE — 86923 COMPATIBILITY TEST ELECTRIC: CPT

## 2019-07-02 PROCEDURE — 85014 HEMATOCRIT: CPT | Performed by: INTERNAL MEDICINE

## 2019-07-02 PROCEDURE — 83735 ASSAY OF MAGNESIUM: CPT | Performed by: NURSE PRACTITIONER

## 2019-07-02 PROCEDURE — 99232 SBSQ HOSP IP/OBS MODERATE 35: CPT | Performed by: INTERNAL MEDICINE

## 2019-07-02 PROCEDURE — 93010 ELECTROCARDIOGRAM REPORT: CPT | Performed by: INTERNAL MEDICINE

## 2019-07-02 PROCEDURE — 36430 TRANSFUSION BLD/BLD COMPNT: CPT

## 2019-07-02 PROCEDURE — 84484 ASSAY OF TROPONIN QUANT: CPT | Performed by: INTERNAL MEDICINE

## 2019-07-02 PROCEDURE — 82330 ASSAY OF CALCIUM: CPT | Performed by: NURSE PRACTITIONER

## 2019-07-02 PROCEDURE — 86901 BLOOD TYPING SEROLOGIC RH(D): CPT

## 2019-07-02 PROCEDURE — 85018 HEMOGLOBIN: CPT | Performed by: INTERNAL MEDICINE

## 2019-07-02 PROCEDURE — 85025 COMPLETE CBC W/AUTO DIFF WBC: CPT | Performed by: NURSE PRACTITIONER

## 2019-07-02 PROCEDURE — 63710000001 DIPHENHYDRAMINE PER 50 MG: Performed by: NURSE PRACTITIONER

## 2019-07-02 PROCEDURE — 25010000002 ONDANSETRON PER 1 MG: Performed by: NURSE PRACTITIONER

## 2019-07-02 PROCEDURE — P9016 RBC LEUKOCYTES REDUCED: HCPCS

## 2019-07-02 PROCEDURE — 80069 RENAL FUNCTION PANEL: CPT | Performed by: INTERNAL MEDICINE

## 2019-07-02 PROCEDURE — 86900 BLOOD TYPING SEROLOGIC ABO: CPT | Performed by: NURSE PRACTITIONER

## 2019-07-02 PROCEDURE — 86850 RBC ANTIBODY SCREEN: CPT | Performed by: NURSE PRACTITIONER

## 2019-07-02 RX ORDER — ACETAMINOPHEN 325 MG/1
650 TABLET ORAL ONCE
Status: COMPLETED | OUTPATIENT
Start: 2019-07-02 | End: 2019-07-02

## 2019-07-02 RX ORDER — DIPHENHYDRAMINE HCL 25 MG
25 TABLET ORAL ONCE
Status: COMPLETED | OUTPATIENT
Start: 2019-07-02 | End: 2019-07-02

## 2019-07-02 RX ADMIN — ONDANSETRON HYDROCHLORIDE 4 MG: 4 TABLET, FILM COATED ORAL at 12:09

## 2019-07-02 RX ADMIN — ONDANSETRON 4 MG: 2 INJECTION INTRAMUSCULAR; INTRAVENOUS at 04:36

## 2019-07-02 RX ADMIN — ISOSORBIDE MONONITRATE 30 MG: 30 TABLET, EXTENDED RELEASE ORAL at 12:16

## 2019-07-02 RX ADMIN — CARVEDILOL 6.25 MG: 6.25 TABLET, FILM COATED ORAL at 01:11

## 2019-07-02 RX ADMIN — CARVEDILOL 6.25 MG: 6.25 TABLET, FILM COATED ORAL at 09:38

## 2019-07-02 RX ADMIN — DIPHENHYDRAMINE HCL 25 MG: 25 TABLET ORAL at 19:06

## 2019-07-02 RX ADMIN — FUROSEMIDE 40 MG: 40 TABLET ORAL at 01:10

## 2019-07-02 RX ADMIN — CARVEDILOL 6.25 MG: 6.25 TABLET, FILM COATED ORAL at 18:17

## 2019-07-02 RX ADMIN — ROSUVASTATIN CALCIUM 40 MG: 10 TABLET, FILM COATED ORAL at 01:10

## 2019-07-02 RX ADMIN — Medication 10 ML: at 20:36

## 2019-07-02 RX ADMIN — FUROSEMIDE 40 MG: 40 TABLET ORAL at 05:21

## 2019-07-02 RX ADMIN — ASPIRIN 81 MG: 81 TABLET, COATED ORAL at 09:38

## 2019-07-02 RX ADMIN — LEVOTHYROXINE SODIUM 75 MCG: 75 TABLET ORAL at 05:21

## 2019-07-02 RX ADMIN — DIPHENHYDRAMINE HCL 25 MG: 25 TABLET ORAL at 06:33

## 2019-07-02 RX ADMIN — TRAMADOL HYDROCHLORIDE 50 MG: 50 TABLET, COATED ORAL at 04:18

## 2019-07-02 RX ADMIN — ACETAMINOPHEN 650 MG: 325 TABLET, FILM COATED ORAL at 06:34

## 2019-07-02 RX ADMIN — TRAMADOL HYDROCHLORIDE 50 MG: 50 TABLET, COATED ORAL at 12:11

## 2019-07-02 RX ADMIN — ACETAMINOPHEN 650 MG: 325 TABLET, FILM COATED ORAL at 19:06

## 2019-07-02 RX ADMIN — GABAPENTIN 300 MG: 300 CAPSULE ORAL at 20:36

## 2019-07-02 RX ADMIN — Medication 10 ML: at 09:38

## 2019-07-02 RX ADMIN — TRAMADOL HYDROCHLORIDE 50 MG: 50 TABLET, COATED ORAL at 19:38

## 2019-07-02 RX ADMIN — ROSUVASTATIN CALCIUM 40 MG: 10 TABLET, FILM COATED ORAL at 20:36

## 2019-07-02 RX ADMIN — GABAPENTIN 300 MG: 300 CAPSULE ORAL at 01:09

## 2019-07-02 RX ADMIN — PANTOPRAZOLE SODIUM 40 MG: 40 TABLET, DELAYED RELEASE ORAL at 12:16

## 2019-07-02 RX ADMIN — FUROSEMIDE 40 MG: 40 TABLET ORAL at 18:17

## 2019-07-02 NOTE — PROGRESS NOTES
"Hospitalist Team      Chief Complaint:  Follow up abd pain and uremia    Subjective      Reports improvement in abd pain after dialysis. Feeling slightly fatigued. No fevers.   Review of Systems   Gastrointestinal: Positive for abdominal pain. Negative for blood in stool.       Objective    Vital Signs  Temp:  [97.9 °F (36.6 °C)-98.2 °F (36.8 °C)] 98.1 °F (36.7 °C)  Heart Rate:  [61-93] 62  Resp:  [17-18] 18  BP: (123-186)/(54-75) 123/54  Oxygen Therapy  SpO2: 95 %  Pulse Oximetry Type: Intermittent  Device (Oxygen Therapy): room air  Flowsheet Rows      First Filed Value   Admission Height  167.6 cm (66\") Documented at 07/01/2019 0851   Admission Weight  97.2 kg (214 lb 4.6 oz) Documented at 07/01/2019 0851        Intake & Output (last 3 days)       06/29 0701 - 06/30 0700 06/30 0701 - 07/01 0700 07/01 0701 - 07/02 0700 07/02 0701 - 07/03 0700    P.O.   240 360    Total Intake(mL/kg)   240 (2.5) 360 (3.7)    Net   +240 +360                Lines, Drains & Airways    Active LDAs     Name:   Placement date:   Placement time:   Site:   Days:    Peripheral IV 07/01/19 Right Antecubital   07/01/19    --    Antecubital   1                Physical Exam:  Gen: NAD  HEENT: EOMI, no icterus, PERRL  Neck: No JVD  Heart: RRR, no murmur  Lung: CTA b/l, adequate air movement  ABD: soft, NT, ND  MSK: moves ext spontaneously  Neuro: AO x 3,     Results Review:     I reviewed the patient's new clinical results.    Results from last 7 days   Lab Units 07/02/19  0233 07/01/19  1331   WBC 10*3/mm3 2.60* 4.90   HEMOGLOBIN g/dL 6.5* 7.5*   HEMATOCRIT % 19.9* 23.9*   PLATELETS 10*3/mm3 109* 127*     Results from last 7 days   Lab Units 07/02/19  0233 07/01/19  1331   SODIUM mmol/L 137 138   POTASSIUM mmol/L 3.3* 3.8   CHLORIDE mmol/L 108 110   CO2 mmol/L 21.0* 15.0*   BUN mg/dL 25* 63*   CREATININE mg/dL 4.90* 9.70*   CALCIUM mg/dL 8.1* 8.2*   GLUCOSE mg/dL 84 158*     Results from last 7 days   Lab Units 07/02/19  0233   MAGNESIUM mg/dL " 1.6*                 @lastrespira@                     Xrays, labs reviewed personally by physician.    ECG/EMG Results (most recent)     Procedure Component Value Units Date/Time    ECG 12 Lead [[id number]] Collected:  07/01/19 1448     Updated:  07/02/19 0653    Narrative:       HEART RATE= 80  bpm  RR Interval= 748  ms  WA Interval= 211  ms  P Horizontal Axis= -12  deg  P Front Axis= 50  deg  QRSD Interval= 94  ms  QT Interval= 408  ms  QRS Axis= 0  deg  T Wave Axis= 138  deg  - ABNORMAL ECG -  Sinus rhythm  Atrial premature complex  Prolonged WA interval  Probable anterior infarct, age indeterminate  Electronically Signed By: Darci Montemayor (LATONYA) 02-Jul-2019 06:53:01  Date and Time of Study: 2019-07-01 14:48:38          Medication Review:   I have reviewed the patient's current medication list    Current Facility-Administered Medications:   •  acetaminophen (TYLENOL) suppository 650 mg, 650 mg, Rectal, Q4H PRN, Doan, Lacey, APRN  •  acetaminophen (TYLENOL) tablet 650 mg, 650 mg, Oral, Q4H PRN, Doan, Lacey, APRN, 650 mg at 07/01/19 1840  •  aspirin EC tablet 81 mg, 81 mg, Oral, Daily, Doan, Lacey, APRN, 81 mg at 07/02/19 0938  •  bisacodyl (DULCOLAX) suppository 10 mg, 10 mg, Rectal, Daily PRN, Doan, Lacey, APRN  •  carvedilol (COREG) tablet 6.25 mg, 6.25 mg, Oral, BID With Meals, Doan, Lacey, APRN, 6.25 mg at 07/02/19 0938  •  [START ON 7/8/2019] cloNIDine (CATAPRES-TTS) 0.2 MG/24HR patch 1 patch, 1 patch, Transdermal, Weekly, Doan, Lacey, APRN  •  dextrose (D50W) 25 g/ 50mL Intravenous Solution 25 g, 25 g, Intravenous, Q15 Min PRN, Doan, Lacey, APRN  •  dextrose (GLUTOSE) oral gel 15 g, 15 g, Oral, Q15 Min PRN, Doan, Lacey, APRN  •  docusate sodium (COLACE) capsule 100 mg, 100 mg, Oral, BID PRN, Doan, Lacey, APRN  •  furosemide (LASIX) tablet 40 mg, 40 mg, Oral, BID, Doan, Lacey, APRN, 40 mg at 07/02/19 0521  •  gabapentin (NEURONTIN) capsule 300 mg, 300 mg, Oral, Nightly, Doan, Lacey,  APRN, 300 mg at 07/02/19 0109  •  glucagon (human recombinant) (GLUCAGEN DIAGNOSTIC) injection 1 mg, 1 mg, Subcutaneous, PRN, Doan, Lacey, APRN  •  hydrALAZINE (APRESOLINE) injection 10 mg, 10 mg, Intravenous, Q4H PRN, Doan, Lacey, APRN  •  insulin lispro (humaLOG) injection 0-9 Units, 0-9 Units, Subcutaneous, 4x Daily With Meals & Nightly, Doan, Lacey, APRN  •  isosorbide mononitrate (IMDUR) 24 hr tablet 30 mg, 30 mg, Oral, QAM, Doan, Lacey, APRN, 30 mg at 07/02/19 1216  •  levothyroxine (SYNTHROID, LEVOTHROID) tablet 75 mcg, 75 mcg, Oral, Q AM, Doan, Lacey, APRN, 75 mcg at 07/02/19 0521  •  melatonin tablet 5 mg, 5 mg, Oral, Nightly PRN, Doan, Lacey, APRN  •  nitroglycerin (NITROSTAT) SL tablet 0.4 mg, 0.4 mg, Sublingual, Q5 Min PRN, Doan, Lacey, APRN  •  ondansetron (ZOFRAN) tablet 4 mg, 4 mg, Oral, Q6H PRN, 4 mg at 07/02/19 1209 **OR** ondansetron (ZOFRAN) injection 4 mg, 4 mg, Intravenous, Q6H PRN, Doan, Lacey, APRN, 4 mg at 07/02/19 0436  •  pantoprazole (PROTONIX) EC tablet 40 mg, 40 mg, Oral, QAM, Doan, Lacey, APRN, 40 mg at 07/02/19 1216  •  rosuvastatin (CRESTOR) tablet 40 mg, 40 mg, Oral, Nightly, Doan, Lacey, APRN, 40 mg at 07/02/19 0110  •  sodium chloride 0.9 % flush 3 mL, 3 mL, Intravenous, Q12H, Doan, Lacey, APRN, 10 mL at 07/02/19 0938  •  sodium chloride 0.9 % flush 3-10 mL, 3-10 mL, Intravenous, PRN, Doan, Lacey, APRN  •  traMADol (ULTRAM) tablet 50 mg, 50 mg, Oral, Q6H PRN, Wil Davison, DO, 50 mg at 07/02/19 1211      Assessment/Plan     Acute on chronic renal failure with acute uremia secondary to noncompliance with dialysis / h/o ESRD dependent on hemodialysis with chronic noncompliance  - nephrology following for dialysis management   - Dialysis tomorrow   - monitor I&Os and daily weight  - continue home Lasix      Hypertensive urgency with h/o HTN  - likely secondary to noncompliance with dialysis  - improved   - continue home Imdur, carvedilol, and Lasix  - closely  monitor BP     Abdominal pain  - appears acute on chronic, improved after dialysis   - CT abd[facility]pelvis [facility] at [facility] showed no acute abnormalities; moderate ascites unchanged  - on Linzess at home  - Tylenol PRN pain  - recommend outpatient follow up with GI     Elevated troponin  - likely chronic secondary to ESRD  - check serial troponin      Acute on chronic Anemia of CKD with recent GI bleed  - hgb 6.5  - patient is scheduled to see GI specialist in [place] next week for recent GI bleed  -no current signs of bleeding   -transfuse 1u PRBC     Hypothyroidism  - continue home levothyroxine      Hyperlipidemia  - continue home statin      Gastroesophageal reflux disease  - continue home PPI     Type 2 diabetes mellitus with diabetic neuropathy  - not on any home diabetes meds   - check A1c  - accu checks AC&HS with SSI  - LCS diet     Medically noncompliant  - encourage complance  - consult      Obesity  - BMI 34.59  - encourage lifestyle modifications      Tobacco dependence syndrome  - encourage cessation   - refused nicotine patch      DVT ppx-SCDs    Plan for disposition:home 24 hours after dialysis and if stable     Wil Davison,   07/02/19  3:06 PM

## 2019-07-02 NOTE — PROGRESS NOTES
Discharge Planning Assessment  [facility]     Patient Name: Marjorie Jacobs  MRN: [id number]  Today's Date: 7/2/2019    Admit Date: 7/1/2019    Discharge Needs Assessment     Row Name 07/02/19 1820       Living Environment    Living Arrangement Comments  Currently living at [address] [place] [address] [place] [facility] [phone] Contact Paolo Reagan.        Discharge Needs Assessment    Concerns Comments  Pt requires hemodialysis be set up at [id number]. She wants only 1 day per wk though she is supposed to receive 2 days per wk of HD. Dr Camacho informed. Must ask permssion from receiving nephrologist if OK. Dr Nicole Orellana.    Equipment Needed After Discharge  none    Outpatient/Agency/Support Group Needs  -- [facility] discharged pt due to noncompliance. Pt was going to various hospitals for HD.     Patient's Choice of Community Agency(s)  [id number]    Discharge Coordination/Progress  Return to [facility], set up HD at [id number]. Initial available information faxed to [facility].        Discharge Plan     Row Name 07/02/19 1829       Plan    Plan  Return to [facility], set up HD at [id number]. Initial available information faxed to [facility].                         Scotty Nieto RN

## 2019-07-02 NOTE — NURSING NOTE
Still not able to transfuse I unit of PRBS's.  Blood bank is not able to release it yet and will call when it becomes available.

## 2019-07-02 NOTE — CONSULTS
Diabetes Education    Patient Name:  Marjorie Jacobs  YOB: 1965  MRN: [id number]  Admit Date:  7/1/2019    Assessment completed d/t DM on problem list. Pt states last year she was on insulin but was having low bs are insulin was discontinued. Pt not taking any DM meds at this time. Discussed with pt her A1c result of 4.7%. Reviewed that this is within the healthy bs range. Pt states she does have bs meter at home but does not routinely check bs since bs has been doing so well. Pt states MD aware that she is not checking routinely. Discussed importance of checking bs if pt feels funny or different in any way. Discussed her bs may be low or high. Pt verbalized her understanding of info and states not needing additional info at this time.      Electronically signed by:  Rasheeda Lai RN  07/02/19 7:47 PM

## 2019-07-02 NOTE — PLAN OF CARE
Problem: Patient Care Overview  Goal: Discharge Needs Assessment  Outcome: Ongoing (interventions implemented as appropriate)   07/01/19 0932 07/02/19 0608   Discharge Needs Assessment   Readmission Within the Last 30 Days --  previous discharge plan unsuccessful   Concerns to be Addressed --  patient refuses services   Patient/Family Anticipates Transition to --  home with family   Patient/Family Anticipated Services at Transition --  none   Transportation Anticipated --  family or friend will provide   Anticipated Changes Related to Illness --  none   Equipment Needed After Discharge --  none   Outpatient/Agency/Support Group Needs --  other (see comments)  (pt needs dialysis on regular bases set up)   Disability   Equipment Currently Used at Home nebulizer;walker, rolling --      Goal: Interprofessional Rounds/Family Conf  Outcome: Ongoing (interventions implemented as appropriate)   07/02/19 1400   Interdisciplinary Rounds/Family Conf   Participants ;nursing;pharmacy

## 2019-07-02 NOTE — PLAN OF CARE
Problem: Patient Care Overview  Goal: Individualization and Mutuality  Outcome: Ongoing (interventions implemented as appropriate)   07/02/19 6271   Individualization   Patient Specific Goals (Include Timeframe) feel better

## 2019-07-02 NOTE — PROGRESS NOTES
"NEPHROLOGY PROGRESS NOTE    [facility]  [phone]  Kashif Camacho MD      Patient Care Team:  Flori Kauffman MD as PCP - General  Flori Kauffman MD as PCP - Claims Attributed  Provider, No Known as PCP - Family Medicine      Provider:  Kashif Camacho MD  Patient Name: Marjorie Jacobs  :  1965    SUBJECTIVE:  F/u ESRD  No cp/soa      Medication:    aspirin 81 mg Oral Daily   carvedilol 6.25 mg Oral BID With Meals   [START ON 2019] cloNIDine 1 patch Transdermal Weekly   furosemide 40 mg Oral BID   gabapentin 300 mg Oral Nightly   insulin lispro 0-9 Units Subcutaneous 4x Daily With Meals & Nightly   isosorbide mononitrate 30 mg Oral QAM   levothyroxine 75 mcg Oral Q AM   pantoprazole 40 mg Oral QAM   rosuvastatin 40 mg Oral Nightly   sodium chloride 3 mL Intravenous Q12H          OBJECTIVE    Vital Sign Min/Max for last 24 hours  Temp  Min: 97.9 °F (36.6 °C)  Max: 98.2 °F (36.8 °C)   BP  Min: 123/54  Max: 214/70   Pulse  Min: 61  Max: 93   Resp  Min: 17  Max: 18   SpO2  Min: 94 %  Max: 96 %   No Data Recorded   No Data Recorded     Flowsheet Rows      First Filed Value   Admission Height  167.6 cm (66\") Documented at 2019 0851   Admission Weight  97.2 kg (214 lb 4.6 oz) Documented at 2019 0851          No intake/output data recorded.  I/O last 3 completed shifts:  In: 240 [P.O.:240]  Out: -     Physical Exam:  General Appearance: alert, appears stated age and cooperative  Head: normocephalic, without obvious abnormality and atraumatic  Eyes: conjunctivae and sclerae normal and no icterus  Neck: supple and no JVD  Lungs: clear to auscultation and respirations regular  Heart: regular rhythm & normal rate and normal S1, S2  Chest: Wall no abnormalities observed  Abdomen: normal bowel sounds and soft non-tender  Extremities: moves extremities well, no edema, no cyanosis and no redness  Skin: no bleeding, bruising or rash, turgor normal, color normal and no " leasions noted  Neurologic: Alert, and oriented. No focal deficits    Labs:    WBC WBC   Date Value Ref Range Status   07/02/2019 2.60 (L) 3.40 - 10.80 10*3/mm3 Final   07/01/2019 4.90 3.40 - 10.80 10*3/mm3 Final      HGB Hemoglobin   Date Value Ref Range Status   07/02/2019 6.5 (C) 12.0 - 15.9 g/dL Final   07/01/2019 7.5 (L) 12.0 - 15.9 g/dL Final      HCT Hematocrit   Date Value Ref Range Status   07/02/2019 19.9 (C) 34.0 - 46.6 % Final   07/01/2019 23.9 (L) 34.0 - 46.6 % Final      Platlets No results found for: LABPLAT   MCV MCV   Date Value Ref Range Status   07/02/2019 94.2 79.0 - 97.0 fL Final   07/01/2019 96.7 79.0 - 97.0 fL Final          Sodium Sodium   Date Value Ref Range Status   07/02/2019 137 136 - 144 mmol/L Final   07/01/2019 138 136 - 144 mmol/L Final      Potassium Potassium   Date Value Ref Range Status   07/02/2019 3.3 (L) 3.6 - 5.1 mmol/L Final   07/01/2019 3.8 3.6 - 5.1 mmol/L Final      Chloride Chloride   Date Value Ref Range Status   07/02/2019 108 101 - 111 mmol/L Final   07/01/2019 110 101 - 111 mmol/L Final      CO2 CO2   Date Value Ref Range Status   07/02/2019 21.0 (L) 22.0 - 32.0 mmol/L Final   07/01/2019 15.0 (L) 22.0 - 32.0 mmol/L Final      BUN BUN   Date Value Ref Range Status   07/02/2019 25 (H) 8 - 20 mg/dL Final   07/01/2019 63 (H) 8 - 20 mg/dL Final      Creatinine Creatinine   Date Value Ref Range Status   07/02/2019 4.90 (H) 0.40 - 1.00 mg/dL Final   07/01/2019 9.70 (H) 0.40 - 1.00 mg/dL Final      Calcium Calcium   Date Value Ref Range Status   07/02/2019 8.1 (L) 8.9 - 10.3 mg/dL Final   07/01/2019 8.2 (L) 8.9 - 10.3 mg/dL Final      PO4 No components found for: PO4   Albumin Albumin   Date Value Ref Range Status   07/02/2019 2.30 (L) 3.50 - 4.80 g/dL Final      Magnesium Magnesium   Date Value Ref Range Status   07/02/2019 1.6 (L) 1.8 - 2.5 mg/dL Final      Uric Acid No components found for: URIC ACID     Imaging Results (last 72 hours)     ** No results found for the last  72 hours. **            Results for orders placed during the hospital encounter of 12/01/18   XR Chest 1 View    Narrative DATE OF SERVICE:  12/1/2018 7:27 AM    PROCEDURE:  XR CHEST PORTABLE AT BEDSIDE    HISTORY:  CHEST PAIN, UNSPECIFIED    COMPARISON:  October 19, 2018    TECHNIQUE:  A radiologic portable AP view of the chest was obtained.    DISCUSSION:  Median sternotomy wires appear intact.  There are stable chronic changes at  the left lung base, likely scarring.  No new focal airspace consolidation is  identified.  The heart and mediastinal contours appear stable.  The pulmonary  vasculature appears normal.    IMPRESSION:  No definite acute cardiopulmonary process identified.        Yared Newsome MD    DS: 12/01/2018 09:01  Report ID: [id number]  cc:  JUSTIN FAM      FINAL AUTHENTICATED COPY                  ASSESSMENT      Acute uremia    Abdominal pain    Acute on chronic renal failure (CMS/HCC)    ESRD on dialysis (CMS/[facility])    Tobacco dependence syndrome    Hypothyroidism    Hyperlipidemia    Gastroesophageal reflux disease    Hypertensive urgency    Type 2 diabetes mellitus with diabetic neuropathy (CMS/[facility])    Medically noncompliant    Obesity (BMI 30-39.9)    Elevated troponin    History of anemia due to CKD    · ESRD  · HTN  · DM  · COPD  · Fluid overload  · Medical non compliance    PLAN  BP better  HD in am      Kashif Camacho MD  [facility]  [phone]  07/02/19  12:14 PM

## 2019-07-02 NOTE — PLAN OF CARE
Problem: Patient Care Overview  Goal: Plan of Care Review  Outcome: Ongoing (interventions implemented as appropriate)   07/02/19 0608   Coping/Psychosocial   Plan of Care Reviewed With patient   OTHER   Outcome Summary pt was given dialysis in her room on my shift. pt was tired after dialysis. thius morning pt stated dialysis was what she needed to fell better.   Plan of Care Review   Progress improving     Goal: Discharge Needs Assessment  Outcome: Ongoing (interventions implemented as appropriate)   07/01/19 0932 07/02/19 0608   Discharge Needs Assessment   Readmission Within the Last 30 Days --  previous discharge plan unsuccessful   Concerns to be Addressed --  patient refuses services   Patient/Family Anticipates Transition to --  home with family   Patient/Family Anticipated Services at Transition --  none   Transportation Anticipated --  family or friend will provide   Anticipated Changes Related to Illness --  none   Equipment Needed After Discharge --  none   Outpatient/Agency/Support Group Needs --  other (see comments)  (pt needs dialysis on regular bases set up)   Disability   Equipment Currently Used at Home nebulizer;walker, rolling --        Problem: Fall Risk (Adult)  Goal: Identify Related Risk Factors and Signs and Symptoms  Outcome: Ongoing (interventions implemented as appropriate)   07/02/19 0608   Fall Risk (Adult)   Related Risk Factors (Fall Risk) gait/mobility problems;homeostatic imbalance;fatigue/slow reaction   Signs and Symptoms (Fall Risk) presence of risk factors     Goal: Absence of Fall  Outcome: Ongoing (interventions implemented as appropriate)   07/02/19 0608   Fall Risk (Adult)   Absence of Fall making progress toward outcome       Problem: Skin Injury Risk (Adult)  Goal: Identify Related Risk Factors and Signs and Symptoms  Outcome: Ongoing (interventions implemented as appropriate)   07/02/19 0608   Skin Injury Risk (Adult)   Related Risk Factors (Skin Injury Risk) fluid  intake inadequate;mechanical forces     Goal: Skin Health and Integrity  Outcome: Ongoing (interventions implemented as appropriate)   07/02/19 0608   Skin Injury Risk (Adult)   Skin Health and Integrity making progress toward outcome

## 2019-07-03 ENCOUNTER — APPOINTMENT (OUTPATIENT)
Dept: GENERAL RADIOLOGY | Facility: HOSPITAL | Age: 54
End: 2019-07-03

## 2019-07-03 LAB
ANION GAP SERPL CALCULATED.3IONS-SCNC: 12.2 MMOL/L (ref 10–20)
BASOPHILS # BLD AUTO: 0 10*3/MM3 (ref 0–0.2)
BASOPHILS NFR BLD AUTO: 1 % (ref 0–1.5)
BUN BLD-MCNC: 30 MG/DL (ref 8–20)
BUN/CREAT SERPL: 4.9 (ref 5.4–26.2)
CALCIUM SPEC-SCNC: 7.8 MG/DL (ref 8.9–10.3)
CHLORIDE SERPL-SCNC: 107 MMOL/L (ref 101–111)
CO2 SERPL-SCNC: 21 MMOL/L (ref 22–32)
CREAT BLD-MCNC: 6.1 MG/DL (ref 0.4–1)
DEPRECATED RDW RBC AUTO: 68.3 FL (ref 37–54)
EOSINOPHIL # BLD AUTO: 0.1 10*3/MM3 (ref 0–0.4)
EOSINOPHIL NFR BLD AUTO: 2.8 % (ref 0.3–6.2)
ERYTHROCYTE [DISTWIDTH] IN BLOOD BY AUTOMATED COUNT: 19.9 % (ref 12.3–15.4)
GFR SERPL CREATININE-BSD FRML MDRD: 7 ML/MIN/1.73
GFR SERPL CREATININE-BSD FRML MDRD: ABNORMAL ML/MIN/1.73
GLUCOSE BLD-MCNC: 83 MG/DL (ref 65–99)
GLUCOSE BLDC GLUCOMTR-MCNC: 152 MG/DL (ref 70–105)
GLUCOSE BLDC GLUCOMTR-MCNC: 80 MG/DL (ref 70–105)
GLUCOSE BLDC GLUCOMTR-MCNC: 91 MG/DL (ref 70–105)
HBV CORE AB SER DONR QL IA: NEGATIVE
HCT VFR BLD AUTO: 23.3 % (ref 34–46.6)
HGB BLD-MCNC: 7.3 G/DL (ref 12–15.9)
LYMPHOCYTES # BLD AUTO: 1 10*3/MM3 (ref 0.7–3.1)
LYMPHOCYTES NFR BLD AUTO: 32.5 % (ref 19.6–45.3)
MCH RBC QN AUTO: 30.5 PG (ref 26.6–33)
MCHC RBC AUTO-ENTMCNC: 31.5 G/DL (ref 31.5–35.7)
MCV RBC AUTO: 96.7 FL (ref 79–97)
MONOCYTES # BLD AUTO: 0.3 10*3/MM3 (ref 0.1–0.9)
MONOCYTES NFR BLD AUTO: 10.2 % (ref 5–12)
NEUTROPHILS # BLD AUTO: 1.7 10*3/MM3 (ref 1.7–7)
NEUTROPHILS NFR BLD AUTO: 53.5 % (ref 42.7–76)
NRBC BLD AUTO-RTO: 0.1 /100 WBC (ref 0–0.2)
PLATELET # BLD AUTO: 96 10*3/MM3 (ref 140–450)
PMV BLD AUTO: 7.8 FL (ref 6–12)
POTASSIUM BLD-SCNC: 4.2 MMOL/L (ref 3.6–5.1)
RBC # BLD AUTO: 2.41 10*6/MM3 (ref 3.77–5.28)
SODIUM BLD-SCNC: 136 MMOL/L (ref 136–144)
WBC NRBC COR # BLD: 3.1 10*3/MM3 (ref 3.4–10.8)

## 2019-07-03 PROCEDURE — 63710000001 INSULIN LISPRO (HUMAN) PER 5 UNITS: Performed by: NURSE PRACTITIONER

## 2019-07-03 PROCEDURE — 25010000002 IRON SUCROSE PER 1 MG: Performed by: INTERNAL MEDICINE

## 2019-07-03 PROCEDURE — 99232 SBSQ HOSP IP/OBS MODERATE 35: CPT | Performed by: INTERNAL MEDICINE

## 2019-07-03 PROCEDURE — 71046 X-RAY EXAM CHEST 2 VIEWS: CPT

## 2019-07-03 PROCEDURE — 80048 BASIC METABOLIC PNL TOTAL CA: CPT | Performed by: NURSE PRACTITIONER

## 2019-07-03 PROCEDURE — 82962 GLUCOSE BLOOD TEST: CPT

## 2019-07-03 PROCEDURE — 5A1D70Z PERFORMANCE OF URINARY FILTRATION, INTERMITTENT, LESS THAN 6 HOURS PER DAY: ICD-10-PCS | Performed by: INTERNAL MEDICINE

## 2019-07-03 PROCEDURE — 90935 HEMODIALYSIS ONE EVALUATION: CPT

## 2019-07-03 PROCEDURE — 85025 COMPLETE CBC W/AUTO DIFF WBC: CPT | Performed by: NURSE PRACTITIONER

## 2019-07-03 RX ORDER — LORAZEPAM 0.5 MG/1
0.5 TABLET ORAL ONCE
Status: COMPLETED | OUTPATIENT
Start: 2019-07-03 | End: 2019-07-03

## 2019-07-03 RX ADMIN — Medication 3 ML: at 08:18

## 2019-07-03 RX ADMIN — ISOSORBIDE MONONITRATE 30 MG: 30 TABLET, EXTENDED RELEASE ORAL at 06:13

## 2019-07-03 RX ADMIN — ROSUVASTATIN CALCIUM 40 MG: 10 TABLET, FILM COATED ORAL at 21:53

## 2019-07-03 RX ADMIN — FUROSEMIDE 40 MG: 40 TABLET ORAL at 06:13

## 2019-07-03 RX ADMIN — IRON SUCROSE 100 MG: 20 INJECTION, SOLUTION INTRAVENOUS at 15:26

## 2019-07-03 RX ADMIN — PANTOPRAZOLE SODIUM 40 MG: 40 TABLET, DELAYED RELEASE ORAL at 06:13

## 2019-07-03 RX ADMIN — LEVOTHYROXINE SODIUM 75 MCG: 75 TABLET ORAL at 06:13

## 2019-07-03 RX ADMIN — ONDANSETRON HYDROCHLORIDE 4 MG: 4 TABLET, FILM COATED ORAL at 09:25

## 2019-07-03 RX ADMIN — GABAPENTIN 300 MG: 300 CAPSULE ORAL at 21:53

## 2019-07-03 RX ADMIN — ASPIRIN 81 MG: 81 TABLET, COATED ORAL at 15:26

## 2019-07-03 RX ADMIN — LORAZEPAM 0.5 MG: 0.5 TABLET ORAL at 08:59

## 2019-07-03 RX ADMIN — TRAMADOL HYDROCHLORIDE 50 MG: 50 TABLET, COATED ORAL at 21:53

## 2019-07-03 RX ADMIN — INSULIN LISPRO 2 UNITS: 100 INJECTION, SOLUTION INTRAVENOUS; SUBCUTANEOUS at 21:53

## 2019-07-03 RX ADMIN — FUROSEMIDE 40 MG: 40 TABLET ORAL at 18:07

## 2019-07-03 RX ADMIN — TRAMADOL HYDROCHLORIDE 50 MG: 50 TABLET, COATED ORAL at 15:28

## 2019-07-03 RX ADMIN — CARVEDILOL 6.25 MG: 6.25 TABLET, FILM COATED ORAL at 15:26

## 2019-07-03 RX ADMIN — TRAMADOL HYDROCHLORIDE 50 MG: 50 TABLET, COATED ORAL at 03:47

## 2019-07-03 RX ADMIN — Medication 3 ML: at 21:58

## 2019-07-03 NOTE — PROGRESS NOTES
"Hospitalist Team      Chief Complaint: Follow up abd pain and uremia        Subjective      Her ABD pain is stable. Feeling less fatigued. No other new issues   Review of Systems   Constitutional: Positive for fatigue. Negative for fever.   Gastrointestinal: Negative for abdominal pain.       Objective    Vital Signs  Temp:  [97.5 °F (36.4 °C)-98.5 °F (36.9 °C)] 97.6 °F (36.4 °C)  Heart Rate:  [57-65] 65  Resp:  [12-20] 14  BP: (105-148)/(59-67) 143/66  Oxygen Therapy  SpO2: 95 %  Pulse Oximetry Type: Intermittent  Device (Oxygen Therapy): room air  Flowsheet Rows      First Filed Value   Admission Height  167.6 cm (66\") Documented at 07/01/2019 0851   Admission Weight  97.2 kg (214 lb 4.6 oz) Documented at 07/01/2019 0851        Intake & Output (last 3 days)       07/01 0701 - 07/02 0700 07/02 0701 - 07/03 0700 07/03 0701 - 07/04 0700    P.O. 240 360 900    Blood  290     Total Intake(mL/kg) 240 (2.5) 650 (6.6) 900 (9.2)    Other   5000    Total Output   5000    Net +240 +650 -4100               Lines, Drains & Airways    Active LDAs     Name:   Placement date:   Placement time:   Site:   Days:    Peripheral IV 07/01/19 Right Antecubital   07/01/19    --    Antecubital   2                Physical Exam:    Gen: NAD  HEENT: EOMI  Heart: RRR, no murmur  Lung: CTA b/l  ABD: soft, NT  MSK: moves ext spontaneously  Neuro: AO x 3  Psych: no anxiety  Skin: warm, dry, intact  Extremities:  No edema    Results Review:     I reviewed the patient's new clinical results.    Results from last 7 days   Lab Units 07/03/19  0248 07/02/19  1920 07/02/19  0233 07/01/19  1331   WBC 10*3/mm3 3.10*  --  2.60* 4.90   HEMOGLOBIN g/dL 7.3* 6.5* 6.5* 7.5*   HEMATOCRIT % 23.3* 20.1* 19.9* 23.9*   PLATELETS 10*3/mm3 96*  --  109* 127*     Results from last 7 days   Lab Units 07/03/19  0248 07/02/19  0233 07/01/19  1331   SODIUM mmol/L 136 137 138   POTASSIUM mmol/L 4.2 3.3* 3.8   CHLORIDE mmol/L 107 108 110   CO2 mmol/L 21.0* 21.0* 15.0* "   BUN mg/dL 30* 25* 63*   CREATININE mg/dL 6.10* 4.90* 9.70*   CALCIUM mg/dL 7.8* 8.1* 8.2*   GLUCOSE mg/dL 83 84 158*     Results from last 7 days   Lab Units 07/02/19  0233   MAGNESIUM mg/dL 1.6*                 @lastrespira@                     Xrays, labs reviewed personally by physician.    ECG/EMG Results (most recent)     Procedure Component Value Units Date/Time    ECG 12 Lead [[id number]] Collected:  07/01/19 1448     Updated:  07/02/19 0653    Narrative:       HEART RATE= 80  bpm  RR Interval= 748  ms  ID Interval= 211  ms  P Horizontal Axis= -12  deg  P Front Axis= 50  deg  QRSD Interval= 94  ms  QT Interval= 408  ms  QRS Axis= 0  deg  T Wave Axis= 138  deg  - ABNORMAL ECG -  Sinus rhythm  Atrial premature complex  Prolonged ID interval  Probable anterior infarct, age indeterminate  Electronically Signed By: Darci Montemayor (LATONYA) 02-Jul-2019 06:53:01  Date and Time of Study: 2019-07-01 14:48:38          Medication Review:   I have reviewed the patient's current medication list    Current Facility-Administered Medications:   •  acetaminophen (TYLENOL) suppository 650 mg, 650 mg, Rectal, Q4H PRN, Doan, Lacey, APRN  •  acetaminophen (TYLENOL) tablet 650 mg, 650 mg, Oral, Q4H PRN, Doan, Lacey, APRN, 650 mg at 07/01/19 1840  •  aspirin EC tablet 81 mg, 81 mg, Oral, Daily, Doan, Lacey, APRN, 81 mg at 07/03/19 1526  •  bisacodyl (DULCOLAX) suppository 10 mg, 10 mg, Rectal, Daily PRN, Doan, Lacey, APRN  •  carvedilol (COREG) tablet 6.25 mg, 6.25 mg, Oral, BID With Meals, Doan, Lacey, APRN, 6.25 mg at 07/03/19 1526  •  [START ON 7/8/2019] cloNIDine (CATAPRES-TTS) 0.2 MG/24HR patch 1 patch, 1 patch, Transdermal, Weekly, Doan, Lacey, APRN  •  dextrose (D50W) 25 g/ 50mL Intravenous Solution 25 g, 25 g, Intravenous, Q15 Min PRN, Lacey Doan APRN  •  dextrose (GLUTOSE) oral gel 15 g, 15 g, Oral, Q15 Min PRN, Lacey Doan APRN  •  docusate sodium (COLACE) capsule 100 mg, 100 mg, Oral, BID PRN, Franki,  Lacey, APRN  •  epoetin mariella-epbx (RETACRIT) injection 14,700 Units, 150 Units/kg, Intravenous, Once per day on Mon Wed Fri, Kashif Camacho MD  •  furosemide (LASIX) tablet 40 mg, 40 mg, Oral, BID, Doan, Lacye, APRN, 40 mg at 07/03/19 1807  •  gabapentin (NEURONTIN) capsule 300 mg, 300 mg, Oral, Nightly, Doan, Lacey, APRN, 300 mg at 07/02/19 2036  •  glucagon (human recombinant) (GLUCAGEN DIAGNOSTIC) injection 1 mg, 1 mg, Subcutaneous, PRN, Doan, Lacey, APRN  •  hydrALAZINE (APRESOLINE) injection 10 mg, 10 mg, Intravenous, Q4H PRN, Doan, Lacey, APRN  •  insulin lispro (humaLOG) injection 0-9 Units, 0-9 Units, Subcutaneous, 4x Daily With Meals & Nightly, Doan, Lacey, APRN  •  isosorbide mononitrate (IMDUR) 24 hr tablet 30 mg, 30 mg, Oral, QAM, Doan, Lacey, APRN, 30 mg at 07/03/19 0613  •  levothyroxine (SYNTHROID, LEVOTHROID) tablet 75 mcg, 75 mcg, Oral, Q AM, Doan, Lacey, APRN, 75 mcg at 07/03/19 0613  •  melatonin tablet 5 mg, 5 mg, Oral, Nightly PRN, Doan, Lacey, APRN  •  nitroglycerin (NITROSTAT) SL tablet 0.4 mg, 0.4 mg, Sublingual, Q5 Min PRN, Doan, Lacey, APRN  •  ondansetron (ZOFRAN) tablet 4 mg, 4 mg, Oral, Q6H PRN, 4 mg at 07/03/19 0925 **OR** ondansetron (ZOFRAN) injection 4 mg, 4 mg, Intravenous, Q6H PRN, Doan, Lacey, APRN, 4 mg at 07/02/19 0436  •  pantoprazole (PROTONIX) EC tablet 40 mg, 40 mg, Oral, QAM, Doan, Lacey, APRN, 40 mg at 07/03/19 0613  •  rosuvastatin (CRESTOR) tablet 40 mg, 40 mg, Oral, Nightly, Doan, Lacey, APRN, 40 mg at 07/02/19 2036  •  sodium chloride 0.9 % flush 3 mL, 3 mL, Intravenous, Q12H, Doan, Lacey, APRN, 3 mL at 07/03/19 0818  •  sodium chloride 0.9 % flush 3-10 mL, 3-10 mL, Intravenous, PRN, Doan, Lacey, APRN  •  traMADol (ULTRAM) tablet 50 mg, 50 mg, Oral, Q6H PRN, Wil Davison, DO, 50 mg at 07/03/19 1528      Assessment/Plan     Acute on chronic renal failure with acute uremia secondary to noncompliance with dialysis / h/o ESRD dependent on  hemodialysis with chronic noncompliance  - nephrology following   - Dialysis today, further schedule per nephrology  - monitor I&Os and daily weight  - continue home Lasix     Hypertensive urgency with h/o HTN  - likely secondary to noncompliance with dialysis  - improved   - continue home Imdur, carvedilol, and Lasix  - closely monitor BP     Abdominal pain  - appears acute on chronic, improved   - CT abd[facility]pelvis [facility] at [facility] showed no acute abnormalities  - c/w bowel regimen  - recommend outpatient follow up with GI     Elevated troponin  - likely chronic secondary to ESRD  - check serial troponin      Acute on chronic Anemia of CKD with recent GI bleed  - hgb 7.3   - patient is scheduled to see GI specialist in [place] next week for recent GI bleed  -no current signs of bleeding   -transfused 1u PRBC  -epo/venofer started      Hypothyroidism  - continue home levothyroxine      Hyperlipidemia  - continue home statin      Gastroesophageal reflux disease  - continue home PPI     Type 2 diabetes mellitus with diabetic neuropathy  - not on any home diabetes meds   - check A1c  - accu checks AC&HS with SSI  - LCS diet     Medically noncompliant  - encourage complance  - consult      Obesity  - BMI 34.59  - encourage lifestyle modifications      Tobacco dependence syndrome  - encourage cessation   - refused nicotine patch     DVT ppx-SCDs    Plan for disposition:will be d/c once outpt dailysis is arranged.     Wil Davison DO  07/03/19  7:13 PM

## 2019-07-03 NOTE — PLAN OF CARE
Problem: Patient Care Overview  Goal: Plan of Care Review  Outcome: Ongoing (interventions implemented as appropriate)   07/03/19 0548   Coping/Psychosocial   Plan of Care Reviewed With patient   OTHER   Outcome Summary pt tolersted meds and dinner well. pt labs comback with better K+ level. pt less weak when ambulating to the bathroom   Plan of Care Review   Progress no change     Goal: Discharge Needs Assessment   07/01/19 0932 07/02/19 0608 07/02/19 1820   Discharge Needs Assessment   Readmission Within the Last 30 Days --  previous discharge plan unsuccessful --    Concerns to be Addressed --  --  --    Patient/Family Anticipates Transition to --  --  --    Patient/Family Anticipated Services at Transition --  --  --    Transportation Anticipated --  --  --    Anticipated Changes Related to Illness --  none --    Equipment Needed After Discharge --  --  --    Outpatient/Agency/Support Group Needs --  --  --    Offered/Gave Vendor List --  --  --    Patient's Choice of Community Agency(s) --  --  [id number]   Current Discharge Risk --  --  --    Disability   Equipment Currently Used at Home nebulizer;walker, rolling --  --     07/03/19 0548   Discharge Needs Assessment   Readmission Within the Last 30 Days --    Concerns to be Addressed discharge planning   Patient/Family Anticipates Transition to home with family   Patient/Family Anticipated Services at Transition none   Transportation Anticipated family or friend will provide   Anticipated Changes Related to Illness --    Equipment Needed After Discharge shower chair;commode   Outpatient/Agency/Support Group Needs outpatient therapy   Offered/Gave Vendor List yes   Patient's Choice of Community Agency(s) --    Current Discharge Risk chronically ill   Disability   Equipment Currently Used at Home --        Problem: Fall Risk (Adult)  Goal: Identify Related Risk Factors and Signs and Symptoms  Outcome: Ongoing (interventions implemented as  appropriate)   07/02/19 0608 07/03/19 0548   Fall Risk (Adult)   Related Risk Factors (Fall Risk) gait/mobility problems;homeostatic imbalance;fatigue/slow reaction --    Signs and Symptoms (Fall Risk) --  presence of risk factors     Goal: Absence of Fall  Outcome: Ongoing (interventions implemented as appropriate)   07/03/19 0548   Fall Risk (Adult)   Absence of Fall making progress toward outcome       Problem: Skin Injury Risk (Adult)  Goal: Identify Related Risk Factors and Signs and Symptoms  Outcome: Ongoing (interventions implemented as appropriate)   07/03/19 0548   Skin Injury Risk (Adult)   Related Risk Factors (Skin Injury Risk) fluid intake inadequate;critical care admission;nutritional deficiencies     Goal: Skin Health and Integrity  Outcome: Ongoing (interventions implemented as appropriate)   07/03/19 0548   Skin Injury Risk (Adult)   Skin Health and Integrity making progress toward outcome

## 2019-07-03 NOTE — PLAN OF CARE
Problem: Patient Care Overview  Goal: Plan of Care Review  Outcome: Ongoing (interventions implemented as appropriate)    Goal: Discharge Needs Assessment  Outcome: Ongoing (interventions implemented as appropriate)    Goal: Interprofessional Rounds/Family Conf  Outcome: Ongoing (interventions implemented as appropriate)      Problem: Fall Risk (Adult)  Goal: Identify Related Risk Factors and Signs and Symptoms  Outcome: Ongoing (interventions implemented as appropriate)    Goal: Absence of Fall  Outcome: Ongoing (interventions implemented as appropriate)      Problem: Skin Injury Risk (Adult)  Goal: Identify Related Risk Factors and Signs and Symptoms  Outcome: Ongoing (interventions implemented as appropriate)    Goal: Skin Health and Integrity  Outcome: Ongoing (interventions implemented as appropriate)

## 2019-07-04 LAB
ABO + RH BLD: NORMAL
ALBUMIN SERPL-MCNC: 2.5 G/DL (ref 3.5–4.8)
ANION GAP SERPL CALCULATED.3IONS-SCNC: 12 MMOL/L (ref 10–20)
BASOPHILS # BLD AUTO: 0 10*3/MM3 (ref 0–0.2)
BASOPHILS NFR BLD AUTO: 0.9 % (ref 0–1.5)
BH BB BLOOD EXPIRATION DATE: NORMAL
BH BB BLOOD TYPE BARCODE: 600
BH BB DISPENSE STATUS: NORMAL
BH BB PRODUCT CODE: NORMAL
BH BB UNIT NUMBER: NORMAL
BUN BLD-MCNC: 16 MG/DL (ref 8–20)
BUN/CREAT SERPL: 3.6 (ref 5.4–26.2)
CALCIUM SPEC-SCNC: 8.1 MG/DL (ref 8.9–10.3)
CHLORIDE SERPL-SCNC: 104 MMOL/L (ref 101–111)
CO2 SERPL-SCNC: 24 MMOL/L (ref 22–32)
CREAT BLD-MCNC: 4.4 MG/DL (ref 0.4–1)
DEPRECATED RDW RBC AUTO: 63.9 FL (ref 37–54)
EOSINOPHIL # BLD AUTO: 0.1 10*3/MM3 (ref 0–0.4)
EOSINOPHIL NFR BLD AUTO: 2.4 % (ref 0.3–6.2)
ERYTHROCYTE [DISTWIDTH] IN BLOOD BY AUTOMATED COUNT: 18.8 % (ref 12.3–15.4)
GFR SERPL CREATININE-BSD FRML MDRD: 10 ML/MIN/1.73
GFR SERPL CREATININE-BSD FRML MDRD: ABNORMAL ML/MIN/1.73
GLUCOSE BLD-MCNC: 76 MG/DL (ref 65–99)
GLUCOSE BLDC GLUCOMTR-MCNC: 107 MG/DL (ref 70–105)
GLUCOSE BLDC GLUCOMTR-MCNC: 72 MG/DL (ref 70–105)
GLUCOSE BLDC GLUCOMTR-MCNC: 85 MG/DL (ref 70–105)
GLUCOSE BLDC GLUCOMTR-MCNC: 85 MG/DL (ref 70–105)
GLUCOSE BLDC GLUCOMTR-MCNC: 89 MG/DL (ref 70–105)
HCT VFR BLD AUTO: 23.4 % (ref 34–46.6)
HGB BLD-MCNC: 7.5 G/DL (ref 12–15.9)
LYMPHOCYTES # BLD AUTO: 0.8 10*3/MM3 (ref 0.7–3.1)
LYMPHOCYTES NFR BLD AUTO: 28.4 % (ref 19.6–45.3)
MCH RBC QN AUTO: 30.6 PG (ref 26.6–33)
MCHC RBC AUTO-ENTMCNC: 32.1 G/DL (ref 31.5–35.7)
MCV RBC AUTO: 95.6 FL (ref 79–97)
MONOCYTES # BLD AUTO: 0.3 10*3/MM3 (ref 0.1–0.9)
MONOCYTES NFR BLD AUTO: 11.1 % (ref 5–12)
NEUTROPHILS # BLD AUTO: 1.6 10*3/MM3 (ref 1.7–7)
NEUTROPHILS NFR BLD AUTO: 57.2 % (ref 42.7–76)
NRBC BLD AUTO-RTO: 0.1 /100 WBC (ref 0–0.2)
PHOSPHATE SERPL-MCNC: 3.8 MG/DL (ref 2.4–4.7)
PLATELET # BLD AUTO: 93 10*3/MM3 (ref 140–450)
PMV BLD AUTO: 7.5 FL (ref 6–12)
POTASSIUM BLD-SCNC: 4 MMOL/L (ref 3.6–5.1)
RBC # BLD AUTO: 2.45 10*6/MM3 (ref 3.77–5.28)
SODIUM BLD-SCNC: 136 MMOL/L (ref 136–144)
UNIT  ABO: NORMAL
UNIT  RH: NORMAL
WBC NRBC COR # BLD: 2.7 10*3/MM3 (ref 3.4–10.8)

## 2019-07-04 PROCEDURE — 99232 SBSQ HOSP IP/OBS MODERATE 35: CPT | Performed by: INTERNAL MEDICINE

## 2019-07-04 PROCEDURE — 82962 GLUCOSE BLOOD TEST: CPT

## 2019-07-04 PROCEDURE — 80069 RENAL FUNCTION PANEL: CPT | Performed by: INTERNAL MEDICINE

## 2019-07-04 PROCEDURE — 85025 COMPLETE CBC W/AUTO DIFF WBC: CPT | Performed by: NURSE PRACTITIONER

## 2019-07-04 RX ADMIN — ROSUVASTATIN CALCIUM 40 MG: 10 TABLET, FILM COATED ORAL at 20:28

## 2019-07-04 RX ADMIN — GABAPENTIN 300 MG: 300 CAPSULE ORAL at 20:27

## 2019-07-04 RX ADMIN — LEVOTHYROXINE SODIUM 75 MCG: 75 TABLET ORAL at 05:43

## 2019-07-04 RX ADMIN — TRAMADOL HYDROCHLORIDE 50 MG: 50 TABLET, COATED ORAL at 05:42

## 2019-07-04 RX ADMIN — ONDANSETRON HYDROCHLORIDE 4 MG: 4 TABLET, FILM COATED ORAL at 20:27

## 2019-07-04 RX ADMIN — CARVEDILOL 6.25 MG: 6.25 TABLET, FILM COATED ORAL at 10:51

## 2019-07-04 RX ADMIN — ASPIRIN 81 MG: 81 TABLET, COATED ORAL at 10:50

## 2019-07-04 RX ADMIN — FUROSEMIDE 40 MG: 40 TABLET ORAL at 05:43

## 2019-07-04 RX ADMIN — CARVEDILOL 6.25 MG: 6.25 TABLET, FILM COATED ORAL at 18:21

## 2019-07-04 RX ADMIN — FUROSEMIDE 40 MG: 40 TABLET ORAL at 18:21

## 2019-07-04 RX ADMIN — Medication 3 ML: at 20:28

## 2019-07-04 RX ADMIN — Medication 3 ML: at 10:53

## 2019-07-04 RX ADMIN — ONDANSETRON HYDROCHLORIDE 4 MG: 4 TABLET, FILM COATED ORAL at 05:43

## 2019-07-04 RX ADMIN — TRAMADOL HYDROCHLORIDE 50 MG: 50 TABLET, COATED ORAL at 20:28

## 2019-07-04 RX ADMIN — PANTOPRAZOLE SODIUM 40 MG: 40 TABLET, DELAYED RELEASE ORAL at 05:43

## 2019-07-04 RX ADMIN — ISOSORBIDE MONONITRATE 30 MG: 30 TABLET, EXTENDED RELEASE ORAL at 10:23

## 2019-07-04 NOTE — PROGRESS NOTES
"NEPHROLOGY PROGRESS NOTE    [facility]  [phone]  Kashif Camacho MD      Patient Care Team:  Flori Kauffman MD as PCP - General  Flori Kauffman MD as PCP - Claims Attributed  Provider, No Known as PCP - Family Medicine      Provider:  Kashif Camacho MD  Patient Name: Marjorie Jacobs  :  1965    SUBJECTIVE:  F/u ESRD  No cp/soa  Wants to go home    Medication:    aspirin 81 mg Oral Daily   carvedilol 6.25 mg Oral BID With Meals   [START ON 2019] cloNIDine 1 patch Transdermal Weekly   epoetin mariella-epbx 150 Units/kg Intravenous Once per day on    furosemide 40 mg Oral BID   gabapentin 300 mg Oral Nightly   insulin lispro 0-9 Units Subcutaneous 4x Daily With Meals & Nightly   isosorbide mononitrate 30 mg Oral QAM   levothyroxine 75 mcg Oral Q AM   pantoprazole 40 mg Oral QAM   rosuvastatin 40 mg Oral Nightly   sodium chloride 3 mL Intravenous Q12H          OBJECTIVE    Vital Sign Min/Max for last 24 hours  Temp  Min: 97.6 °F (36.4 °C)  Max: 98.6 °F (37 °C)   BP  Min: 104/55  Max: 148/67   Pulse  Min: 59  Max: 67   Resp  Min: 14  Max: 18   SpO2  Min: 93 %  Max: 95 %   No Data Recorded   Weight  Min: 92.1 kg (203 lb 0.7 oz)  Max: 92.1 kg (203 lb 0.7 oz)     Flowsheet Rows      First Filed Value   Admission Height  167.6 cm (66\") Documented at 2019 0851   Admission Weight  97.2 kg (214 lb 4.6 oz) Documented at 2019 0851          No intake/output data recorded.  I/O last 3 completed shifts:  In: 1190 [P.O.:900; Blood:290]  Out: 5000 [Other:5000]    Physical Exam:  General Appearance: alert, appears stated age and cooperative  Head: normocephalic, without obvious abnormality and atraumatic  Eyes: conjunctivae and sclerae normal and no icterus  Neck: supple and no JVD  Lungs: clear to auscultation and respirations regular  Heart: regular rhythm & normal rate and normal S1, S2  Chest: Wall no abnormalities observed  Abdomen: normal bowel sounds and " soft non-tender  Extremities: moves extremities well, no edema, no cyanosis and no redness  Skin: no bleeding, bruising or rash, turgor normal, color normal and no leasions noted  Neurologic: Alert, and oriented. No focal deficits    Labs:    WBC WBC   Date Value Ref Range Status   07/04/2019 2.70 (L) 3.40 - 10.80 10*3/mm3 Final   07/03/2019 3.10 (L) 3.40 - 10.80 10*3/mm3 Final   07/02/2019 2.60 (L) 3.40 - 10.80 10*3/mm3 Final   07/01/2019 4.90 3.40 - 10.80 10*3/mm3 Final      HGB Hemoglobin   Date Value Ref Range Status   07/04/2019 7.5 (L) 12.0 - 15.9 g/dL Final   07/03/2019 7.3 (L) 12.0 - 15.9 g/dL Final   07/02/2019 6.5 (C) 12.0 - 15.9 g/dL Final   07/02/2019 6.5 (C) 12.0 - 15.9 g/dL Final   07/01/2019 7.5 (L) 12.0 - 15.9 g/dL Final      HCT Hematocrit   Date Value Ref Range Status   07/04/2019 23.4 (L) 34.0 - 46.6 % Final   07/03/2019 23.3 (L) 34.0 - 46.6 % Final   07/02/2019 20.1 (C) 34.0 - 46.6 % Final   07/02/2019 19.9 (C) 34.0 - 46.6 % Final   07/01/2019 23.9 (L) 34.0 - 46.6 % Final      Platlets No results found for: LABPLAT   MCV MCV   Date Value Ref Range Status   07/04/2019 95.6 79.0 - 97.0 fL Final   07/03/2019 96.7 79.0 - 97.0 fL Final   07/02/2019 94.2 79.0 - 97.0 fL Final   07/01/2019 96.7 79.0 - 97.0 fL Final          Sodium Sodium   Date Value Ref Range Status   07/04/2019 136 136 - 144 mmol/L Final   07/03/2019 136 136 - 144 mmol/L Final   07/02/2019 137 136 - 144 mmol/L Final   07/01/2019 138 136 - 144 mmol/L Final      Potassium Potassium   Date Value Ref Range Status   07/04/2019 4.0 3.6 - 5.1 mmol/L Final   07/03/2019 4.2 3.6 - 5.1 mmol/L Final   07/02/2019 3.3 (L) 3.6 - 5.1 mmol/L Final   07/01/2019 3.8 3.6 - 5.1 mmol/L Final      Chloride Chloride   Date Value Ref Range Status   07/04/2019 104 101 - 111 mmol/L Final   07/03/2019 107 101 - 111 mmol/L Final   07/02/2019 108 101 - 111 mmol/L Final   07/01/2019 110 101 - 111 mmol/L Final      CO2 CO2   Date Value Ref Range Status   07/04/2019  24.0 22.0 - 32.0 mmol/L Final   07/03/2019 21.0 (L) 22.0 - 32.0 mmol/L Final   07/02/2019 21.0 (L) 22.0 - 32.0 mmol/L Final   07/01/2019 15.0 (L) 22.0 - 32.0 mmol/L Final      BUN BUN   Date Value Ref Range Status   07/04/2019 16 8 - 20 mg/dL Final   07/03/2019 30 (H) 8 - 20 mg/dL Final   07/02/2019 25 (H) 8 - 20 mg/dL Final   07/01/2019 63 (H) 8 - 20 mg/dL Final      Creatinine Creatinine   Date Value Ref Range Status   07/04/2019 4.40 (H) 0.40 - 1.00 mg/dL Final   07/03/2019 6.10 (H) 0.40 - 1.00 mg/dL Final   07/02/2019 4.90 (H) 0.40 - 1.00 mg/dL Final   07/01/2019 9.70 (H) 0.40 - 1.00 mg/dL Final      Calcium Calcium   Date Value Ref Range Status   07/04/2019 8.1 (L) 8.9 - 10.3 mg/dL Final   07/03/2019 7.8 (L) 8.9 - 10.3 mg/dL Final   07/02/2019 8.1 (L) 8.9 - 10.3 mg/dL Final   07/01/2019 8.2 (L) 8.9 - 10.3 mg/dL Final      PO4 No components found for: PO4   Albumin Albumin   Date Value Ref Range Status   07/04/2019 2.50 (L) 3.50 - 4.80 g/dL Final   07/02/2019 2.30 (L) 3.50 - 4.80 g/dL Final      Magnesium Magnesium   Date Value Ref Range Status   07/02/2019 1.6 (L) 1.8 - 2.5 mg/dL Final      Uric Acid No components found for: URIC ACID     Imaging Results (last 72 hours)     ** No results found for the last 72 hours. **            Results for orders placed during the hospital encounter of 07/01/19   XR Chest PA & Lateral    Narrative XR CHEST PA AND LATERAL-     Date of Exam: 7/3/2019 4:12 PM     Indication: placement.     Comparison: Portable chest x-ray 12/01/2018     Technique: Two views of the chest were obtained.     FINDINGS: The cardiac silhouette remains mildly enlarged. Sternal  wires are redemonstrated. There are mild patchy left basilar opacities.  The right lung appears clear. No pleural effusion is seen.       Impression    1. Mildly enlarged cardiac silhouette.  2. Mild patchy left basilar opacities, which may be due to atelectasis  and/or pneumonia.        Electronically Signed By-Rima Paredes  On:7/3/2019 4:24 PM  This report was finalized on [id number] by  Rima Paredes, .                ASSESSMENT      Acute uremia    Abdominal pain    Acute on chronic renal failure (CMS/[facility])    ESRD on dialysis (CMS/[facility])    Tobacco dependence syndrome    Hypothyroidism    Hyperlipidemia    Gastroesophageal reflux disease    Hypertensive urgency    Type 2 diabetes mellitus with diabetic neuropathy (CMS/[facility])    Medically noncompliant    Obesity (BMI 30-39.9)    Elevated troponin    History of anemia due to CKD    Uremia    · ESRD  · HTN  · DM  · COPD  · Fluid overload  · Medical non compliance  · Anemia--EPO/venofer    PLAN  HD tomorrow  Awaits oupt dialysis  Prob home tomorrow    Kashif Camacho MD  [facility]  [phone]  07/04/19  12:03 PM

## 2019-07-04 NOTE — PROGRESS NOTES
"Hospitalist Team      Chief Complaint:  Follow up renal failure     Subjective      No further abd pain. Less leg swelling. No other new issues.     Review of Systems   Constitutional: Negative for fever.   Cardiovascular: Negative for leg swelling.       Objective    Vital Signs  Temp:  [98.5 °F (36.9 °C)-98.6 °F (37 °C)] 98.6 °F (37 °C)  Heart Rate:  [59-67] 67  Resp:  [18] 18  BP: (104-138)/(55-63) 138/63  Oxygen Therapy  SpO2: 93 %  Pulse Oximetry Type: Intermittent  Device (Oxygen Therapy): room air  Flowsheet Rows      First Filed Value   Admission Height  167.6 cm (66\") Documented at 07/01/2019 0851   Admission Weight  97.2 kg (214 lb 4.6 oz) Documented at 07/01/2019 0851        Intake & Output (last 3 days)       07/01 0701 - 07/02 0700 07/02 0701 - 07/03 0700 07/03 0701 - 07/04 0700 07/04 0701 - 07/05 0700    P.O. 240 360 900     Blood  290      Total Intake(mL/kg) 240 (2.5) 650 (6.6) 900 (9.8)     Other   5000     Total Output   5000     Net +240 +650 -4100                 Lines, Drains & Airways    Active LDAs     Name:   Placement date:   Placement time:   Site:   Days:    Peripheral IV 07/01/19 Right Antecubital   07/01/19    --    Antecubital   3                Physical Exam:    Gen: NAD  HEENT: EOMI  Heart: RRR, no murmur  Lung: CTA b/l  ABD: soft, NT  MSK: moves ext spontaneously  Neuro: AO x 3  Psych: no anxiety  Skin: warm, dry, intact  Extremities:  No edema      Results Review:     I reviewed the patient's new clinical results.    Results from last 7 days   Lab Units 07/04/19  0243 07/03/19  0248 07/02/19  1920 07/02/19  0233   WBC 10*3/mm3 2.70* 3.10*  --  2.60*   HEMOGLOBIN g/dL 7.5* 7.3* 6.5* 6.5*   HEMATOCRIT % 23.4* 23.3* 20.1* 19.9*   PLATELETS 10*3/mm3 93* 96*  --  109*     Results from last 7 days   Lab Units 07/04/19  0243 07/03/19  0248 07/02/19  0233   SODIUM mmol/L 136 136 137   POTASSIUM mmol/L 4.0 4.2 3.3*   CHLORIDE mmol/L 104 107 108   CO2 mmol/L 24.0 21.0* 21.0*   BUN mg/dL 16 " 30* 25*   CREATININE mg/dL 4.40* 6.10* 4.90*   CALCIUM mg/dL 8.1* 7.8* 8.1*   GLUCOSE mg/dL 76 83 84     Results from last 7 days   Lab Units 07/02/19  0233   MAGNESIUM mg/dL 1.6*                 @lastrespira@                     Xrays, labs reviewed personally by physician.    ECG/EMG Results (most recent)     Procedure Component Value Units Date/Time    ECG 12 Lead [[id number]] Collected:  07/01/19 1448     Updated:  07/02/19 0653    Narrative:       HEART RATE= 80  bpm  RR Interval= 748  ms  MA Interval= 211  ms  P Horizontal Axis= -12  deg  P Front Axis= 50  deg  QRSD Interval= 94  ms  QT Interval= 408  ms  QRS Axis= 0  deg  T Wave Axis= 138  deg  - ABNORMAL ECG -  Sinus rhythm  Atrial premature complex  Prolonged MA interval  Probable anterior infarct, age indeterminate  Electronically Signed By: Darci Montemayor (LATONYA) 02-Jul-2019 06:53:01  Date and Time of Study: 2019-07-01 14:48:38          Medication Review:   I have reviewed the patient's current medication list    Current Facility-Administered Medications:   •  acetaminophen (TYLENOL) suppository 650 mg, 650 mg, Rectal, Q4H PRN, Doan, Lacey, APRN  •  acetaminophen (TYLENOL) tablet 650 mg, 650 mg, Oral, Q4H PRN, Doan, Lacey, APRN, 650 mg at 07/01/19 1840  •  aspirin EC tablet 81 mg, 81 mg, Oral, Daily, Doan, Lacey, APRN, 81 mg at 07/04/19 1050  •  bisacodyl (DULCOLAX) suppository 10 mg, 10 mg, Rectal, Daily PRN, Doan, Lacey, APRN  •  carvedilol (COREG) tablet 6.25 mg, 6.25 mg, Oral, BID With Meals, Doan, Lacey, APRN, 6.25 mg at 07/04/19 1051  •  [START ON 7/8/2019] cloNIDine (CATAPRES-TTS) 0.2 MG/24HR patch 1 patch, 1 patch, Transdermal, Weekly, Doan, Lacey, APRN  •  dextrose (D50W) 25 g/ 50mL Intravenous Solution 25 g, 25 g, Intravenous, Q15 Min PRN, Lacey Doan, FIDEL  •  dextrose (GLUTOSE) oral gel 15 g, 15 g, Oral, Q15 Min PRN, Lacey Doan, FIDEL  •  docusate sodium (COLACE) capsule 100 mg, 100 mg, Oral, BID PRN, Lacey Doan, FIDEL  •   epoetin mariella-epbx (RETACRIT) injection 14,700 Units, 150 Units/kg, Intravenous, Once per day on Mon Wed Fri, Kashif Camacho MD  •  furosemide (LASIX) tablet 40 mg, 40 mg, Oral, BID, Doan, Lacey, APRN, 40 mg at 07/04/19 0543  •  gabapentin (NEURONTIN) capsule 300 mg, 300 mg, Oral, Nightly, Doan, Lacey, APRN, 300 mg at 07/03/19 2153  •  glucagon (human recombinant) (GLUCAGEN DIAGNOSTIC) injection 1 mg, 1 mg, Subcutaneous, PRN, Doan, Lacey, APRN  •  hydrALAZINE (APRESOLINE) injection 10 mg, 10 mg, Intravenous, Q4H PRN, Doan, Lacey, APRN  •  insulin lispro (humaLOG) injection 0-9 Units, 0-9 Units, Subcutaneous, 4x Daily With Meals & Nightly, Doan, Lacey, APRN, 2 Units at 07/03/19 2153  •  isosorbide mononitrate (IMDUR) 24 hr tablet 30 mg, 30 mg, Oral, QAM, Doan, Lacey, APRN, 30 mg at 07/04/19 1023  •  levothyroxine (SYNTHROID, LEVOTHROID) tablet 75 mcg, 75 mcg, Oral, Q AM, Doan, Lacey, APRN, 75 mcg at 07/04/19 0543  •  melatonin tablet 5 mg, 5 mg, Oral, Nightly PRN, Doan, Lacey, APRN  •  nitroglycerin (NITROSTAT) SL tablet 0.4 mg, 0.4 mg, Sublingual, Q5 Min PRN, Doan, Lacey, APRN  •  ondansetron (ZOFRAN) tablet 4 mg, 4 mg, Oral, Q6H PRN, 4 mg at 07/04/19 0543 **OR** ondansetron (ZOFRAN) injection 4 mg, 4 mg, Intravenous, Q6H PRN, Doan, Lacey, APRN, 4 mg at 07/02/19 0436  •  pantoprazole (PROTONIX) EC tablet 40 mg, 40 mg, Oral, QAM, Doan, Lacey, APRN, 40 mg at 07/04/19 0543  •  rosuvastatin (CRESTOR) tablet 40 mg, 40 mg, Oral, Nightly, Doan, Lacey, APRN, 40 mg at 07/03/19 2153  •  sodium chloride 0.9 % flush 3 mL, 3 mL, Intravenous, Q12H, Doan, Lacey, APRN, 3 mL at 07/04/19 1053  •  sodium chloride 0.9 % flush 3-10 mL, 3-10 mL, Intravenous, PRN, Doan, Lacey, APRN  •  traMADol (ULTRAM) tablet 50 mg, 50 mg, Oral, Q6H PRN, Wil Davison, DO, 50 mg at 07/04/19 0542      Assessment/Plan     Acute on chronic renal failure with acute uremia secondary to noncompliance with dialysis / h/o ESRD  dependent on hemodialysis with chronic noncompliance  - nephrology following   - Dialysis  again tomorrow   - monitor I&Os and daily weight  - continue home Lasix     Hypertensive urgency with h/o HTN  - likely secondary to noncompliance with dialysis  - improved   - continue home Imdur, carvedilol, and Lasix  - closely monitor BP     Abdominal pain  - appears acute on chronic, improved   - CT abd[facility]pelvis [facility] showed no acute abnormalities  - c/w bowel regimen  - recommend outpatient follow up with GI     Elevated troponin  - likely chronic secondary to ESRD  - check serial troponin      Acute on chronic Anemia of CKD with recent GI bleed  - hgb 7.5  - patient is scheduled to see GI specialist in [place] next week for recent GI bleed  -no current signs of bleeding   -transfused 1u PRBC  -c/w epo/venofer      Hypothyroidism  - continue home levothyroxine      Hyperlipidemia  - continue home statin      Gastroesophageal reflux disease  - continue home PPI     Type 2 diabetes mellitus with diabetic neuropathy  - not on any home diabetes meds   - accu checks AC&HS with [facility]     Medically noncompliant  - encourage complance  - consult      Obesity  - BMI 34.59  - encourage lifestyle modifications      Tobacco dependence syndrome  - encourage cessation   - refused nicotine patch     DVT ppx-SCDs    Plan for disposition:poss home 24 hours if dialysis arranged     Wil Davison,   07/04/19  5:02 PM

## 2019-07-04 NOTE — PLAN OF CARE
Problem: Patient Care Overview  Goal: Plan of Care Review  Outcome: Ongoing (interventions implemented as appropriate)   07/04/19 1816   Coping/Psychosocial   Plan of Care Reviewed With patient     Goal: Individualization and Mutuality  Outcome: Ongoing (interventions implemented as appropriate)    Goal: Discharge Needs Assessment  Outcome: Ongoing (interventions implemented as appropriate)   07/04/19 1816   Discharge Needs Assessment   Readmission Within the Last 30 Days unable to assess   Concerns to be Addressed no discharge needs identified   Patient/Family Anticipates Transition to home with family   Patient/Family Anticipated Services at Transition none   Transportation Anticipated family or friend will provide   Anticipated Changes Related to Illness none   Equipment Needed After Discharge none   Disability   Equipment Currently Used at Home none     Goal: Interprofessional Rounds/Family Conf  Outcome: Ongoing (interventions implemented as appropriate)      Problem: Fall Risk (Adult)  Goal: Identify Related Risk Factors and Signs and Symptoms  Outcome: Ongoing (interventions implemented as appropriate)    Goal: Absence of Fall  Outcome: Ongoing (interventions implemented as appropriate)      Problem: Skin Injury Risk (Adult)  Goal: Identify Related Risk Factors and Signs and Symptoms  Outcome: Ongoing (interventions implemented as appropriate)    Goal: Skin Health and Integrity  Outcome: Ongoing (interventions implemented as appropriate)

## 2019-07-05 VITALS
DIASTOLIC BLOOD PRESSURE: 70 MMHG | TEMPERATURE: 97.6 F | RESPIRATION RATE: 18 BRPM | HEART RATE: 56 BPM | SYSTOLIC BLOOD PRESSURE: 188 MMHG | OXYGEN SATURATION: 95 % | BODY MASS INDEX: 32.63 KG/M2 | HEIGHT: 66 IN | WEIGHT: 203.04 LBS

## 2019-07-05 LAB
ALBUMIN SERPL-MCNC: 2.5 G/DL (ref 3.5–4.8)
ANION GAP SERPL CALCULATED.3IONS-SCNC: 13.3 MMOL/L (ref 10–20)
BASOPHILS # BLD AUTO: 0 10*3/MM3 (ref 0–0.2)
BASOPHILS NFR BLD AUTO: 1.3 % (ref 0–1.5)
BUN BLD-MCNC: 20 MG/DL (ref 8–20)
BUN/CREAT SERPL: 3.7 (ref 5.4–26.2)
CALCIUM SPEC-SCNC: 8.1 MG/DL (ref 8.9–10.3)
CHLORIDE SERPL-SCNC: 103 MMOL/L (ref 101–111)
CO2 SERPL-SCNC: 22 MMOL/L (ref 22–32)
CREAT BLD-MCNC: 5.4 MG/DL (ref 0.4–1)
DEPRECATED RDW RBC AUTO: 69.1 FL (ref 37–54)
EOSINOPHIL # BLD AUTO: 0.1 10*3/MM3 (ref 0–0.4)
EOSINOPHIL NFR BLD AUTO: 2.9 % (ref 0.3–6.2)
ERYTHROCYTE [DISTWIDTH] IN BLOOD BY AUTOMATED COUNT: 19.9 % (ref 12.3–15.4)
GFR SERPL CREATININE-BSD FRML MDRD: 8 ML/MIN/1.73
GFR SERPL CREATININE-BSD FRML MDRD: ABNORMAL ML/MIN/1.73
GLUCOSE BLD-MCNC: 83 MG/DL (ref 65–99)
GLUCOSE BLDC GLUCOMTR-MCNC: 72 MG/DL (ref 70–105)
HCT VFR BLD AUTO: 24.7 % (ref 34–46.6)
HGB BLD-MCNC: 7.8 G/DL (ref 12–15.9)
LYMPHOCYTES # BLD AUTO: 0.8 10*3/MM3 (ref 0.7–3.1)
LYMPHOCYTES NFR BLD AUTO: 29.4 % (ref 19.6–45.3)
MCH RBC QN AUTO: 30.8 PG (ref 26.6–33)
MCHC RBC AUTO-ENTMCNC: 31.5 G/DL (ref 31.5–35.7)
MCV RBC AUTO: 97.7 FL (ref 79–97)
MONOCYTES # BLD AUTO: 0.3 10*3/MM3 (ref 0.1–0.9)
MONOCYTES NFR BLD AUTO: 11.9 % (ref 5–12)
NEUTROPHILS # BLD AUTO: 1.5 10*3/MM3 (ref 1.7–7)
NEUTROPHILS NFR BLD AUTO: 54.5 % (ref 42.7–76)
NRBC BLD AUTO-RTO: 0.2 /100 WBC (ref 0–0.2)
PHOSPHATE SERPL-MCNC: 4.5 MG/DL (ref 2.4–4.7)
PLATELET # BLD AUTO: 107 10*3/MM3 (ref 140–450)
PMV BLD AUTO: 8.8 FL (ref 6–12)
POTASSIUM BLD-SCNC: 4.3 MMOL/L (ref 3.6–5.1)
RBC # BLD AUTO: 2.53 10*6/MM3 (ref 3.77–5.28)
SODIUM BLD-SCNC: 134 MMOL/L (ref 136–144)
WBC NRBC COR # BLD: 2.7 10*3/MM3 (ref 3.4–10.8)

## 2019-07-05 PROCEDURE — 5A1D70Z PERFORMANCE OF URINARY FILTRATION, INTERMITTENT, LESS THAN 6 HOURS PER DAY: ICD-10-PCS | Performed by: INTERNAL MEDICINE

## 2019-07-05 PROCEDURE — 80069 RENAL FUNCTION PANEL: CPT | Performed by: INTERNAL MEDICINE

## 2019-07-05 PROCEDURE — 82962 GLUCOSE BLOOD TEST: CPT

## 2019-07-05 PROCEDURE — 99239 HOSP IP/OBS DSCHRG MGMT >30: CPT | Performed by: INTERNAL MEDICINE

## 2019-07-05 PROCEDURE — 90935 HEMODIALYSIS ONE EVALUATION: CPT

## 2019-07-05 PROCEDURE — 85025 COMPLETE CBC W/AUTO DIFF WBC: CPT | Performed by: NURSE PRACTITIONER

## 2019-07-05 PROCEDURE — 25010000002 EPOETIN ALFA-EPBX 10000 UNIT/ML SOLUTION: Performed by: INTERNAL MEDICINE

## 2019-07-05 RX ORDER — ONDANSETRON 4 MG/1
4 TABLET, FILM COATED ORAL EVERY 8 HOURS PRN
Qty: 30 TABLET | Refills: 0 | Status: SHIPPED | OUTPATIENT
Start: 2019-07-05

## 2019-07-05 RX ORDER — TRAMADOL HYDROCHLORIDE 50 MG/1
50 TABLET ORAL EVERY 8 HOURS PRN
Qty: 20 TABLET | Refills: 0 | Status: SHIPPED | OUTPATIENT
Start: 2019-07-05 | End: 2019-07-11

## 2019-07-05 RX ORDER — LORAZEPAM 0.5 MG/1
0.5 TABLET ORAL ONCE AS NEEDED
Status: COMPLETED | OUTPATIENT
Start: 2019-07-05 | End: 2019-07-05

## 2019-07-05 RX ADMIN — ASPIRIN 81 MG: 81 TABLET, COATED ORAL at 09:35

## 2019-07-05 RX ADMIN — LORAZEPAM 0.5 MG: 0.5 TABLET ORAL at 09:34

## 2019-07-05 RX ADMIN — TRAMADOL HYDROCHLORIDE 50 MG: 50 TABLET, COATED ORAL at 05:19

## 2019-07-05 RX ADMIN — PANTOPRAZOLE SODIUM 40 MG: 40 TABLET, DELAYED RELEASE ORAL at 05:13

## 2019-07-05 RX ADMIN — ONDANSETRON HYDROCHLORIDE 4 MG: 4 TABLET, FILM COATED ORAL at 14:57

## 2019-07-05 RX ADMIN — LEVOTHYROXINE SODIUM 75 MCG: 75 TABLET ORAL at 05:15

## 2019-07-05 RX ADMIN — EPOETIN ALFA-EPBX 14700 UNITS: 10000 INJECTION, SOLUTION INTRAVENOUS; SUBCUTANEOUS at 10:20

## 2019-07-05 RX ADMIN — ISOSORBIDE MONONITRATE 30 MG: 30 TABLET, EXTENDED RELEASE ORAL at 05:14

## 2019-07-05 RX ADMIN — ONDANSETRON HYDROCHLORIDE 4 MG: 4 TABLET, FILM COATED ORAL at 05:22

## 2019-07-05 RX ADMIN — FUROSEMIDE 40 MG: 40 TABLET ORAL at 05:16

## 2019-07-05 RX ADMIN — CARVEDILOL 6.25 MG: 6.25 TABLET, FILM COATED ORAL at 09:36

## 2019-07-05 RX ADMIN — TRAMADOL HYDROCHLORIDE 50 MG: 50 TABLET, COATED ORAL at 14:57

## 2019-07-05 NOTE — DISCHARGE SUMMARY
Date of Admission: 7/1/2019    Date of Discharge:  7/5/2019    Length of stay:  LOS: 4 days     Admission Diagnosis:  Acute on chronic renal failure with acute uremia secondary to noncompliance with dialysis / h/o ESRD dependent on hemodialysis with chronic noncompliance    Discharge Diagnosis:   Acute on chronic renal failure with acute uremia secondary to noncompliance with dialysis / h/o ESRD dependent on hemodialysis with chronic noncompliance  - nephrology follow up  - Dialysis  again tomorrow   -Plans for dialysis 2 times a week as outpatient     Hypertensive urgency with h/o HTN  - likely secondary to noncompliance with dialysis  -resolved  - continue home Imdur, carvedilol, and Lasix     Abdominal pain  - appears acute on chronic, improved   - CT abd/pelvis done at [facility] showed no acute abnormalities  - c/w bowel regimen  - recommend outpatient follow up with GI     Elevated troponin  - likely chronic secondary to ESRD  - check serial troponin      Acute on chronic Anemia of CKD with recent GI bleed  - hgb 7.8   - patient is scheduled to see GI specialist in [place] next week for recent GI bleed  -no current signs of bleeding   -transfused 1u PRBC  -c/w epo/venofer with dialysis     Hypothyroidism  - continue home levothyroxine      Hyperlipidemia  - continue home statin      Gastroesophageal reflux disease  - continue home PPI     Type 2 diabetes mellitus with diabetic neuropathy  - not on any home diabetes meds   - accu checks AC&HS with [facility]     Medically noncompliant  - encourage complance  - followed     Obesity  - BMI 34.59  - encourage lifestyle modifications      Tobacco dependence syndrome  - encourage cessation   - refused nicotine patch      Hospital Course  Patient is a 54 y.o. female presented with abdominal pain and noncompliance with dialysis for over 2 to 3 weeks time.  He was admitted seen by nephrology restarted on dialysis and had an improvement with abdominal pain as  well as all lab abnormalities.   and  worked diligently to help with the patient's issues with getting to dialysis and a referral has been made for a dialysis center closer to her new place of residence.  Also patient will be able to get transportation through her insurance if needed.  Patient was wanting to be discharged home, stable to discharge home but she was encouraged to continue to be compliant with dialysis and she would need to continue to follow-up with chair time at the dialysis center.    Past Medical History:     Past Medical History:   Diagnosis Date   • COPD (chronic obstructive pulmonary disease) (CMS/[facility])    • Coronary artery disease    • Diabetes mellitus (CMS/[facility])    • Disease of thyroid gland    • Elevated cholesterol    • ESRD (end stage renal disease) on dialysis (CMS/[facility])    • GERD (gastroesophageal reflux disease)    • Hypertension    • Medically noncompliant        Past Surgical History:     Past Surgical History:   Procedure Laterality Date   • ABDOMINAL SURGERY      Gall Bladder   • APPENDECTOMY     • CARDIAC CATHETERIZATION     • CARDIAC SURGERY     • COLONOSCOPY     • ENDOSCOPY     • HYSTERECTOMY     • VASCULAR SURGERY         Social History:   Social History     Socioeconomic History   • Marital status:      Spouse name: Not on file   • Number of children: Not on file   • Years of education: Not on file   • Highest education level: Not on file   Tobacco Use   • Smoking status: Current Every Day Smoker     Packs/day: 0.50     Years: 30.00     Pack years: 15.00     Types: Cigarettes   • Smokeless tobacco: Never Used   Substance and Sexual Activity   • Alcohol use: No     Frequency: Never   • Drug use: No   • Sexual activity: Defer       Procedures Performed:none         Consults:   Consults     Date and Time Order Name Status Description    7/1/2019 1002 Inpatient Nephrology Consult Completed           Pertinent Test Results:     Lab Results (last 72  hours)     Procedure Component Value Units Date/Time    POC Glucose Once [[id number]]  (Normal) Collected:  07/05/19 0712    Specimen:  Blood Updated:  07/05/19 0713     Glucose 72 mg/dL      Comment: Serial Number: [id number]:  [id number]       Renal Function Panel [[id number]]  (Abnormal) Collected:  07/05/19 0214    Specimen:  Blood Updated:  07/05/19 0259     Glucose 83 mg/dL      BUN 20 mg/dL      Creatinine 5.40 mg/dL      Sodium 134 mmol/L      Potassium 4.3 mmol/L      Chloride 103 mmol/L      CO2 22.0 mmol/L      Calcium 8.1 mg/dL      Albumin 2.50 g/dL      Phosphorus 4.5 mg/dL      Anion Gap 13.3 mmol/L      BUN/Creatinine Ratio 3.7     eGFR Non African Amer 8 mL/min/1.73      Comment: <15 Indicative of kidney failure.        eGFR   Amer -- mL/min/1.73      Comment: <15 Indicative of kidney failure.       CBC & Differential [[id number]] Collected:  07/05/19 0214    Specimen:  Blood Updated:  07/05/19 0240    Narrative:       The following orders were created for panel order CBC & Differential.  Procedure                               Abnormality         Status                     ---------                               -----------         ------                     CBC Auto Differential[[id number]]        Abnormal            Final result                 Please view results for these tests on the individual orders.    CBC Auto Differential [[id number]]  (Abnormal) Collected:  07/05/19 0214    Specimen:  Blood Updated:  07/05/19 0240     WBC 2.70 10*3/mm3      RBC 2.53 10*6/mm3      Hemoglobin 7.8 g/dL      Hematocrit 24.7 %      MCV 97.7 fL      MCH 30.8 pg      MCHC 31.5 g/dL      RDW 19.9 %      RDW-SD 69.1 fl      MPV 8.8 fL      Platelets 107 10*3/mm3      Neutrophil % 54.5 %      Lymphocyte % 29.4 %      Monocyte % 11.9 %      Eosinophil % 2.9 %      Basophil % 1.3 %      Neutrophils, Absolute 1.50 10*3/mm3      Lymphocytes, Absolute 0.80 10*3/mm3      Monocytes, Absolute 0.30 10*3/mm3       Eosinophils, Absolute 0.10 10*3/mm3      Basophils, Absolute 0.00 10*3/mm3      nRBC 0.2 /100 WBC     POC Glucose Once [[id number]]  (Normal) Collected:  07/04/19 1945    Specimen:  Blood Updated:  07/04/19 1946     Glucose 89 mg/dL      Comment: Serial Number: [id number]:  [id number]       POC Glucose Once [[id number]]  (Normal) Collected:  07/04/19 1620    Specimen:  Blood Updated:  07/04/19 1620     Glucose 85 mg/dL      Comment: Serial Number: [id number]:  [id number]       POC Glucose Once [[id number]]  (Abnormal) Collected:  07/04/19 1142    Specimen:  Blood Updated:  07/04/19 1143     Glucose 107 mg/dL      Comment: Serial Number: [id number]:  [id number]       POC Glucose Once [[id number]]  (Normal) Collected:  07/04/19 0705    Specimen:  Blood Updated:  07/04/19 0706     Glucose 85 mg/dL      Comment: Serial Number: [id number]:  [id number]       POC Glucose Once [[id number]]  (Normal) Collected:  07/04/19 0511    Specimen:  Blood Updated:  07/04/19 0512     Glucose 72 mg/dL      Comment: Serial Number: [id number]:  [id number]       Renal Function Panel [[id number]]  (Abnormal) Collected:  07/04/19 0243    Specimen:  Blood Updated:  07/04/19 0459     Glucose 76 mg/dL      BUN 16 mg/dL      Creatinine 4.40 mg/dL      Sodium 136 mmol/L      Potassium 4.0 mmol/L      Chloride 104 mmol/L      CO2 24.0 mmol/L      Calcium 8.1 mg/dL      Albumin 2.50 g/dL      Phosphorus 3.8 mg/dL      Anion Gap 12.0 mmol/L      BUN/Creatinine Ratio 3.6     eGFR Non African Amer 10 mL/min/1.73      Comment: <15 Indicative of kidney failure.        eGFR   Amer -- mL/min/1.73      Comment: <15 Indicative of kidney failure.       CBC & Differential [[id number]] Collected:  07/04/19 0243    Specimen:  Blood Updated:  07/04/19 0326    Narrative:       The following orders were created for panel order CBC & Differential.  Procedure                               Abnormality         Status                      ---------                               -----------         ------                     CBC Auto Differential[[id number]]        Abnormal            Final result                 Please view results for these tests on the individual orders.    CBC Auto Differential [[id number]]  (Abnormal) Collected:  07/04/19 0243    Specimen:  Blood Updated:  07/04/19 0326     WBC 2.70 10*3/mm3      RBC 2.45 10*6/mm3      Hemoglobin 7.5 g/dL      Hematocrit 23.4 %      MCV 95.6 fL      MCH 30.6 pg      MCHC 32.1 g/dL      RDW 18.8 %      RDW-SD 63.9 fl      MPV 7.5 fL      Platelets 93 10*3/mm3      Neutrophil % 57.2 %      Lymphocyte % 28.4 %      Monocyte % 11.1 %      Eosinophil % 2.4 %      Basophil % 0.9 %      Neutrophils, Absolute 1.60 10*3/mm3      Lymphocytes, Absolute 0.80 10*3/mm3      Monocytes, Absolute 0.30 10*3/mm3      Eosinophils, Absolute 0.10 10*3/mm3      Basophils, Absolute 0.00 10*3/mm3      nRBC 0.1 /100 WBC     POC Glucose Once [[id number]]  (Abnormal) Collected:  07/03/19 1959    Specimen:  Blood Updated:  07/03/19 2022     Glucose 152 mg/dL      Comment: Serial Number: [id number]:  [id number]       POC Glucose Once [[id number]]  (Normal) Collected:  07/03/19 1655    Specimen:  Blood Updated:  07/03/19 1657     Glucose 91 mg/dL      Comment: Serial Number: [id number]:  [id number]       Hepatitis B Core Antibody, Total [[id number]] Collected:  07/01/19 2109    Specimen:  Blood Updated:  07/03/19 0910     Hep B Core Total Ab Negative    Narrative:       Performed at:  [address] [place] [address]  [id number]  : Ashok Peters PhD, Phone:  [phone]    POC Glucose Once [[id number]]  (Normal) Collected:  07/03/19 0732    Specimen:  Blood Updated:  07/03/19 0734     Glucose 80 mg/dL      Comment: Serial Number: [id number]:  [id number]       Basic Metabolic Panel [[id number]]  (Abnormal) Collected:  07/03/19 0248    Specimen:  Blood Updated:  07/03/19 0348      Glucose 83 mg/dL      BUN 30 mg/dL      Creatinine 6.10 mg/dL      Sodium 136 mmol/L      Potassium 4.2 mmol/L      Chloride 107 mmol/L      CO2 21.0 mmol/L      Calcium 7.8 mg/dL      eGFR  African Amer -- mL/min/1.73      Comment: <15 Indicative of kidney failure.        eGFR Non African Amer 7 mL/min/1.73      Comment: <15 Indicative of kidney failure.        BUN/Creatinine Ratio 4.9     Anion Gap 12.2 mmol/L     CBC & Differential [[id number]] Collected:  07/03/19 0248    Specimen:  Blood Updated:  07/03/19 0311    Narrative:       The following orders were created for panel order CBC & Differential.  Procedure                               Abnormality         Status                     ---------                               -----------         ------                     CBC Auto Differential[[id number]]        Abnormal            Final result                 Please view results for these tests on the individual orders.    CBC Auto Differential [[id number]]  (Abnormal) Collected:  07/03/19 0248    Specimen:  Blood Updated:  07/03/19 0311     WBC 3.10 10*3/mm3      RBC 2.41 10*6/mm3      Hemoglobin 7.3 g/dL      Hematocrit 23.3 %      MCV 96.7 fL      MCH 30.5 pg      MCHC 31.5 g/dL      RDW 19.9 %      RDW-SD 68.3 fl      MPV 7.8 fL      Platelets 96 10*3/mm3      Neutrophil % 53.5 %      Lymphocyte % 32.5 %      Monocyte % 10.2 %      Eosinophil % 2.8 %      Basophil % 1.0 %      Neutrophils, Absolute 1.70 10*3/mm3      Lymphocytes, Absolute 1.00 10*3/mm3      Monocytes, Absolute 0.30 10*3/mm3      Eosinophils, Absolute 0.10 10*3/mm3      Basophils, Absolute 0.00 10*3/mm3      nRBC 0.1 /100 WBC     POC Glucose Once [[id number]]  (Normal) Collected:  07/02/19 2307    Specimen:  Blood Updated:  07/02/19 2308     Glucose 87 mg/dL      Comment: Serial Number: [id number]:  [id number]       Hemoglobin & Hematocrit, Blood [[id number]]  (Abnormal) Collected:  07/02/19 1920    Specimen:  Blood Updated:  07/02/19 1945      Hemoglobin 6.5 g/dL      Hematocrit 20.1 %     POC Glucose Once [[id number]]  (Normal) Collected:  07/02/19 1626    Specimen:  Blood Updated:  07/02/19 1630     Glucose 103 mg/dL      Comment: Serial Number: [id number]:  [id number]                No results found for: BLOODCX   No results found for: URINECX   No results found for: WOUNDCX   No results found for: RESPCX   No results found for: STOOLCX   No results found for: STOOLCXY   No results found for: MRSACX   No results found for: VRECX   No results found for: CRECX   No components found for: AFBSTAINCX   No results found for: AFBCX   No results found for: AFBCXBLD   No results found for: FUNGUSCX   No components found for: GMSSTAIN   No results found for: KOHPREP   No results found for: ANACX   No results found for: BODYFLDCX   No results found for: CSFCX   No results found for: CULTURE   No results found for: THROATCX   No results found for: THROATCXBS   No results found for: ICECX   No results found for: DICECX   No results found for: GCCX           Imaging Results (all)     Procedure Component Value Units Date/Time    XR Chest PA & Lateral [[id number]] Collected:  07/03/19 1622     Updated:  07/03/19 1626    Narrative:       XR CHEST PA AND LATERAL-     Date of Exam: 7/3/2019 4:12 PM     Indication: placement.     Comparison: Portable chest x-ray 12/01/2018     Technique: Two views of the chest were obtained.     FINDINGS: The cardiac silhouette remains mildly enlarged. Sternal  wires are redemonstrated. There are mild patchy left basilar opacities.  The right lung appears clear. No pleural effusion is seen.       Impression:          1. Mildly enlarged cardiac silhouette.  2. Mild patchy left basilar opacities, which may be due to atelectasis  and/or pneumonia.        Electronically Signed By-Rima Paredes On:7/3/2019 4:24 PM  This report was finalized on [id number] by  Rima Paredes, .            Condition on Discharge:  stable    Vital  Signs  Temp:  [97.5 °F (36.4 °C)-98.3 °F (36.8 °C)] 97.6 °F (36.4 °C)  Heart Rate:  [56-62] 56  Resp:  [17-18] 18  BP: (114-188)/(54-81) 188/70    Physical Exam:  Physical Exam   Constitutional: She is oriented to person, place, and time. She appears well-developed.   HENT:   Head: Normocephalic.   Neck: Normal range of motion.   Cardiovascular: Normal rate and regular rhythm.   Abdominal: Soft. Bowel sounds are normal.   Neurological: She is alert and oriented to person, place, and time.   Skin: Skin is warm and dry.   Psychiatric: She has a normal mood and affect.       Discharge Disposition  Home or Self Care    Discharge Medications     Discharge Medications      New Medications      Instructions Start Date   ondansetron 4 MG tablet  Commonly known as:  ZOFRAN   4 mg, Oral, Every 8 Hours PRN      traMADol 50 MG tablet  Commonly known as:  ULTRAM   50 mg, Oral, Every 8 Hours PRN         Continue These Medications      Instructions Start Date   aspirin 81 MG EC tablet   81 mg, Oral, Daily, In the morning       carvedilol 6.25 MG tablet  Commonly known as:  COREG   6.25 mg, Oral, 2 Times Daily With Meals      docusate sodium 100 MG capsule  Commonly known as:  COLACE   100 mg, Oral, 2 Times Daily PRN      fluconazole 100 MG tablet  Commonly known as:  DIFLUCAN   100 mg, Oral, Daily      furosemide 40 MG tablet  Commonly known as:  LASIX   40 mg, Oral, 2 Times Daily      gabapentin 300 MG capsule  Commonly known as:  NEURONTIN   300 mg, Oral, Daily, Take at bedtime       isosorbide mononitrate 30 MG 24 hr tablet  Commonly known as:  IMDUR   30 mg, Oral, Daily, In the morning       levothyroxine 75 MCG tablet  Commonly known as:  SYNTHROID, LEVOTHROID   75 mcg, Oral, Daily      linaclotide 290 MCG capsule capsule  Commonly known as:  LINZESS   290 mcg, Oral, Every Morning Before Breakfast      omeprazole 40 MG capsule  Commonly known as:  priLOSEC   40 mg, Oral, Daily      promethazine 25 MG tablet  Commonly known  as:  PHENERGAN   25 mg, Oral, Every 6 Hours PRN      rosuvastatin 20 MG tablet  Commonly known as:  CRESTOR   40 mg, Oral, Daily             Discharge Diet:   Diet Instructions     Diet: Renal      Discharge Diet:  Renal          Activity at Discharge:     Follow-up Appointments  No future appointments.  Additional Instructions for the Follow-ups that You Need to Schedule     Discharge Follow-up with Specialty: Nephrology; 1 Week   As directed      Specialty:  Nephrology    Follow Up:  1 Week               Test Results Pending at Discharge       Risk for Readmission (LACE) Score: 12 (7/5/2019  6:00 AM)          Wil Davison DO  07/05/19  4:08 PM    Time: Discharge 40 min

## 2019-07-05 NOTE — PLAN OF CARE
Problem: Skin Injury Risk (Adult)  Goal: Identify Related Risk Factors and Signs and Symptoms  Outcome: Ongoing (interventions implemented as appropriate)   07/05/19 8884   Skin Injury Risk (Adult)   Related Risk Factors (Skin Injury Risk) advanced age;edema;medication

## 2019-07-05 NOTE — PROGRESS NOTES
"NEPHROLOGY PROGRESS NOTE    [facility]  [phone]  Kashif Camacho MD      Patient Care Team:  Flori Kauffman MD as PCP - General  Flori Kauffman MD as PCP - Claims Attributed  Provider, No Known as PCP - Family Medicine      Provider:  Kashif Camacho MD  Patient Name: Marjorie Jacobs  :  1965    SUBJECTIVE:  F/u ESRD  No cp/soa  Seen and ex on dialysis, UF 4L, BP ok    Medication:    aspirin 81 mg Oral Daily   carvedilol 6.25 mg Oral BID With Meals   [START ON 2019] cloNIDine 1 patch Transdermal Weekly   epoetin mariella-epbx 150 Units/kg Intravenous Once per day on    furosemide 40 mg Oral BID   gabapentin 300 mg Oral Nightly   insulin lispro 0-9 Units Subcutaneous 4x Daily With Meals & Nightly   isosorbide mononitrate 30 mg Oral QAM   levothyroxine 75 mcg Oral Q AM   pantoprazole 40 mg Oral QAM   rosuvastatin 40 mg Oral Nightly   sodium chloride 3 mL Intravenous Q12H          OBJECTIVE    Vital Sign Min/Max for last 24 hours  Temp  Min: 97.5 °F (36.4 °C)  Max: 98.3 °F (36.8 °C)   BP  Min: 114/63  Max: 138/63   Pulse  Min: 59  Max: 67   Resp  Min: 17  Max: 17   SpO2  Min: 93 %  Max: 97 %   No Data Recorded   No Data Recorded     Flowsheet Rows      First Filed Value   Admission Height  167.6 cm (66\") Documented at 2019 0851   Admission Weight  97.2 kg (214 lb 4.6 oz) Documented at 2019 0851          I/O this shift:  In: 360 [P.O.:360]  Out: -   No intake/output data recorded.    Physical Exam:  General Appearance: alert, appears stated age and cooperative  Head: normocephalic, without obvious abnormality and atraumatic  Eyes: conjunctivae and sclerae normal and no icterus  Neck: supple and no JVD  Lungs: clear to auscultation and respirations regular  Heart: regular rhythm & normal rate and normal S1, S2  Chest: Wall no abnormalities observed  Abdomen: normal bowel sounds and soft non-tender  Extremities: moves extremities well, no edema, no " cyanosis and no redness  Skin: no bleeding, bruising or rash, turgor normal, color normal and no leasions noted  Neurologic: Alert, and oriented. No focal deficits    Labs:    WBC WBC   Date Value Ref Range Status   07/05/2019 2.70 (L) 3.40 - 10.80 10*3/mm3 Final   07/04/2019 2.70 (L) 3.40 - 10.80 10*3/mm3 Final   07/03/2019 3.10 (L) 3.40 - 10.80 10*3/mm3 Final      HGB Hemoglobin   Date Value Ref Range Status   07/05/2019 7.8 (L) 12.0 - 15.9 g/dL Final   07/04/2019 7.5 (L) 12.0 - 15.9 g/dL Final   07/03/2019 7.3 (L) 12.0 - 15.9 g/dL Final   07/02/2019 6.5 (C) 12.0 - 15.9 g/dL Final      HCT Hematocrit   Date Value Ref Range Status   07/05/2019 24.7 (L) 34.0 - 46.6 % Final   07/04/2019 23.4 (L) 34.0 - 46.6 % Final   07/03/2019 23.3 (L) 34.0 - 46.6 % Final   07/02/2019 20.1 (C) 34.0 - 46.6 % Final      Platlets No results found for: LABPLAT   MCV MCV   Date Value Ref Range Status   07/05/2019 97.7 (H) 79.0 - 97.0 fL Final   07/04/2019 95.6 79.0 - 97.0 fL Final   07/03/2019 96.7 79.0 - 97.0 fL Final          Sodium Sodium   Date Value Ref Range Status   07/05/2019 134 (L) 136 - 144 mmol/L Final   07/04/2019 136 136 - 144 mmol/L Final   07/03/2019 136 136 - 144 mmol/L Final      Potassium Potassium   Date Value Ref Range Status   07/05/2019 4.3 3.6 - 5.1 mmol/L Final   07/04/2019 4.0 3.6 - 5.1 mmol/L Final   07/03/2019 4.2 3.6 - 5.1 mmol/L Final      Chloride Chloride   Date Value Ref Range Status   07/05/2019 103 101 - 111 mmol/L Final   07/04/2019 104 101 - 111 mmol/L Final   07/03/2019 107 101 - 111 mmol/L Final      CO2 CO2   Date Value Ref Range Status   07/05/2019 22.0 22.0 - 32.0 mmol/L Final   07/04/2019 24.0 22.0 - 32.0 mmol/L Final   07/03/2019 21.0 (L) 22.0 - 32.0 mmol/L Final      BUN BUN   Date Value Ref Range Status   07/05/2019 20 8 - 20 mg/dL Final   07/04/2019 16 8 - 20 mg/dL Final   07/03/2019 30 (H) 8 - 20 mg/dL Final      Creatinine Creatinine   Date Value Ref Range Status   07/05/2019 5.40 (H) 0.40  - 1.00 mg/dL Final   07/04/2019 4.40 (H) 0.40 - 1.00 mg/dL Final   07/03/2019 6.10 (H) 0.40 - 1.00 mg/dL Final      Calcium Calcium   Date Value Ref Range Status   07/05/2019 8.1 (L) 8.9 - 10.3 mg/dL Final   07/04/2019 8.1 (L) 8.9 - 10.3 mg/dL Final   07/03/2019 7.8 (L) 8.9 - 10.3 mg/dL Final      PO4 No components found for: PO4   Albumin Albumin   Date Value Ref Range Status   07/05/2019 2.50 (L) 3.50 - 4.80 g/dL Final   07/04/2019 2.50 (L) 3.50 - 4.80 g/dL Final      Magnesium No results found for: MG   Uric Acid No components found for: URIC ACID     Imaging Results (last 72 hours)     ** No results found for the last 72 hours. **            Results for orders placed during the hospital encounter of 07/01/19   XR Chest PA & Lateral    Narrative XR CHEST PA AND LATERAL-     Date of Exam: 7/3/2019 4:12 PM     Indication: placement.     Comparison: Portable chest x-ray 12/01/2018     Technique: Two views of the chest were obtained.     FINDINGS: The cardiac silhouette remains mildly enlarged. Sternal  wires are redemonstrated. There are mild patchy left basilar opacities.  The right lung appears clear. No pleural effusion is seen.       Impression    1. Mildly enlarged cardiac silhouette.  2. Mild patchy left basilar opacities, which may be due to atelectasis  and/or pneumonia.        Electronically Signed By-Rima Paredes On:7/3/2019 4:24 PM  This report was finalized on [id number] by  Rima Paredes, .                ASSESSMENT      Acute uremia    Abdominal pain    Acute on chronic renal failure (CMS/[facility])    ESRD on dialysis (CMS/[facility])    Tobacco dependence syndrome    Hypothyroidism    Hyperlipidemia    Gastroesophageal reflux disease    Hypertensive urgency    Type 2 diabetes mellitus with diabetic neuropathy (CMS/[facility])    Medically noncompliant    Obesity (BMI 30-39.9)    Elevated troponin    History of anemia due to CKD    Uremia    · ESRD  · HTN  · DM  · COPD  · Fluid overload  · Medical non  compliance  · Anemia--EPO/venofer    PLAN  HD today  Awaits oupt dialysis  Prob home     Kashif Camacho MD  [facility]  [phone]  07/05/19  10:34 AM

## 2019-07-05 NOTE — PLAN OF CARE
Problem: Fall Risk (Adult)  Goal: Absence of Fall  Outcome: Ongoing (interventions implemented as appropriate)   07/05/19 0414   Fall Risk (Adult)   Absence of Fall making progress toward outcome

## 2019-07-05 NOTE — PLAN OF CARE
Problem: Skin Injury Risk (Adult)  Goal: Skin Health and Integrity  Outcome: Ongoing (interventions implemented as appropriate)   07/05/19 0414   Skin Injury Risk (Adult)   Skin Health and Integrity making progress toward outcome

## 2019-07-05 NOTE — PLAN OF CARE
Problem: Patient Care Overview  Goal: Plan of Care Review  Outcome: Ongoing (interventions implemented as appropriate)   07/05/19 2628   Coping/Psychosocial   Plan of Care Reviewed With patient   OTHER   Outcome Summary pt has been resting well, and has only complained of pain once   Plan of Care Review   Progress no change

## 2019-07-05 NOTE — PLAN OF CARE
Problem: Fall Risk (Adult)  Goal: Identify Related Risk Factors and Signs and Symptoms  Outcome: Ongoing (interventions implemented as appropriate)   07/05/19 9736   Fall Risk (Adult)   Related Risk Factors (Fall Risk) age-related changes;impaired vision   Signs and Symptoms (Fall Risk) presence of risk factors

## 2019-07-05 NOTE — PROGRESS NOTES
Case Management Discharge Note    Final Note: Home with sister in [place], awaiting [facility] time for [facility] [place] [facility], Will finalize plan 7/8/19.                 Final Discharge Disposition Code: 01 - home or self-care

## 2019-07-06 ENCOUNTER — READMISSION MANAGEMENT (OUTPATIENT)
Dept: CALL CENTER | Facility: HOSPITAL | Age: 54
End: 2019-07-06

## 2019-07-06 RX ORDER — TRAMADOL HYDROCHLORIDE 50 MG/1
50 TABLET ORAL EVERY 8 HOURS PRN
Qty: 16 TABLET | Refills: 0 | Status: SHIPPED | OUTPATIENT
Start: 2019-07-06

## 2019-07-06 NOTE — OUTREACH NOTE
Prep Survey      Responses   Facility patient discharged from?  [facility]   Is patient eligible?  Yes   Discharge diagnosis  Acute uremiaAcute on chronic renal failure with acute uremia secondary to noncompliance with dialysis / h/o ESRD dependent on hemodialysis with chronic noncompliance   Does the patient have one of the following disease processes/diagnoses(primary or secondary)?  Other   Does the patient have Home health ordered?  No   Is there a DME ordered?  No   Prep survey completed?  Yes          Maribell Sosa RN

## 2019-07-09 ENCOUNTER — READMISSION MANAGEMENT (OUTPATIENT)
Dept: CALL CENTER | Facility: HOSPITAL | Age: 54
End: 2019-07-09

## 2019-07-09 NOTE — OUTREACH NOTE
Medical Week 1 Survey      Responses   Facility patient discharged from?  [facility]   Does the patient have one of the following disease processes/diagnoses(primary or secondary)?  Other   Is there a successful TCM telephone encounter documented?  No   Week 1 attempt successful?  No   Unsuccessful attempts  Attempt 4 [Left Voicemail]          Zehra Ramos LPN     Patient returned call and states she could not get the tramadol filled due to it having the wrong PATRICIO # on it.  Email sent to ELLIOT Arriola regarding this.  Patient states they can call [facility] in [place] with the correct one.  Lauren called in PATRICIO and Corinne at [facility] stated they are ready.  Patient notified.

## 2019-07-18 ENCOUNTER — READMISSION MANAGEMENT (OUTPATIENT)
Dept: CALL CENTER | Facility: HOSPITAL | Age: 54
End: 2019-07-18

## 2019-07-18 NOTE — OUTREACH NOTE
Medical Week 2 Survey      Responses   Facility patient discharged from?  [facility]   Does the patient have one of the following disease processes/diagnoses(primary or secondary)?  Other   Week 2 attempt successful?  Yes   Call start time  0912   Rescheduled  Revoked   Revoke  Readmitted [PT STATES SHE IS CURRENTLY IN THE HOSPITAL IN [place]]   Call end time  0913          Sabrina Cruz LPN